# Patient Record
Sex: FEMALE | Race: WHITE | NOT HISPANIC OR LATINO | Employment: OTHER | URBAN - METROPOLITAN AREA
[De-identification: names, ages, dates, MRNs, and addresses within clinical notes are randomized per-mention and may not be internally consistent; named-entity substitution may affect disease eponyms.]

---

## 2017-01-12 ENCOUNTER — OFFICE VISIT (OUTPATIENT)
Dept: ORTHOPEDICS | Facility: CLINIC | Age: 42
End: 2017-01-12
Payer: MEDICARE

## 2017-01-12 ENCOUNTER — OFFICE VISIT (OUTPATIENT)
Dept: SPINE | Facility: CLINIC | Age: 42
End: 2017-01-12
Attending: NEUROLOGICAL SURGERY
Payer: MEDICARE

## 2017-01-12 ENCOUNTER — HOSPITAL ENCOUNTER (OUTPATIENT)
Dept: RADIOLOGY | Facility: HOSPITAL | Age: 42
Discharge: HOME | End: 2017-01-12
Attending: NEUROLOGICAL SURGERY
Payer: MEDICARE

## 2017-01-12 VITALS
SYSTOLIC BLOOD PRESSURE: 105 MMHG | BODY MASS INDEX: 21.09 KG/M2 | WEIGHT: 119 LBS | HEIGHT: 63 IN | HEART RATE: 68 BPM | DIASTOLIC BLOOD PRESSURE: 54 MMHG

## 2017-01-12 VITALS
BODY MASS INDEX: 21.21 KG/M2 | WEIGHT: 119.69 LBS | HEART RATE: 73 BPM | DIASTOLIC BLOOD PRESSURE: 73 MMHG | SYSTOLIC BLOOD PRESSURE: 110 MMHG

## 2017-01-12 DIAGNOSIS — M25.551 RIGHT HIP PAIN: Primary | ICD-10-CM

## 2017-01-12 DIAGNOSIS — M79.10 MYALGIA: ICD-10-CM

## 2017-01-12 DIAGNOSIS — M41.125 ADOLESCENT IDIOPATHIC SCOLIOSIS OF THORACOLUMBAR REGION: ICD-10-CM

## 2017-01-12 DIAGNOSIS — M53.3 SACROILIAC JOINT PAIN: ICD-10-CM

## 2017-01-12 DIAGNOSIS — M47.816 FACET ARTHROPATHY, LUMBAR: ICD-10-CM

## 2017-01-12 PROCEDURE — 78320 NM BONE SCAN SPECT: CPT | Mod: 26,76,, | Performed by: RADIOLOGY

## 2017-01-12 PROCEDURE — 99213 OFFICE O/P EST LOW 20 MIN: CPT | Mod: PBBFAC,27 | Performed by: ANESTHESIOLOGY

## 2017-01-12 PROCEDURE — 99204 OFFICE O/P NEW MOD 45 MIN: CPT | Mod: S$PBB,,, | Performed by: ANESTHESIOLOGY

## 2017-01-12 PROCEDURE — 99999 PR PBB SHADOW E&M-EST. PATIENT-LVL III: CPT | Mod: PBBFAC,,, | Performed by: ANESTHESIOLOGY

## 2017-01-12 PROCEDURE — 99203 OFFICE O/P NEW LOW 30 MIN: CPT | Mod: S$PBB,,, | Performed by: PHYSICIAN ASSISTANT

## 2017-01-12 PROCEDURE — 78320 NM BONE SCAN SPECT: CPT | Mod: 26,,, | Performed by: RADIOLOGY

## 2017-01-12 PROCEDURE — 99999 PR PBB SHADOW E&M-EST. PATIENT-LVL III: CPT | Mod: PBBFAC,,, | Performed by: PHYSICIAN ASSISTANT

## 2017-01-12 RX ORDER — TOPIRAMATE 100 MG/1
100 TABLET, FILM COATED ORAL 2 TIMES DAILY
COMMUNITY
End: 2022-08-08

## 2017-01-12 RX ORDER — HYDROCODONE BITARTRATE AND ACETAMINOPHEN 10; 325 MG/1; MG/1
TABLET ORAL
COMMUNITY
End: 2022-08-19 | Stop reason: CLARIF

## 2017-01-12 RX ORDER — OXYCODONE AND ACETAMINOPHEN 10; 325 MG/1; MG/1
1 TABLET ORAL EVERY 4 HOURS PRN
COMMUNITY

## 2017-01-12 RX ORDER — TIZANIDINE 4 MG/1
4 TABLET ORAL EVERY 6 HOURS PRN
COMMUNITY
End: 2022-08-08

## 2017-01-12 RX ORDER — ALPRAZOLAM 0.5 MG/1
0.5 TABLET ORAL 3 TIMES DAILY
COMMUNITY
End: 2022-08-19 | Stop reason: CLARIF

## 2017-01-12 NOTE — LETTER
January 12, 2017      Beka Dwyer MD  [address]  [facility] [address]           [facility]  [address]  [address]  [facility] [address]  Phone: [phone]  Fax: [phone]          Patient: Rosalinda Gomes   MR Number: [id number]   YOB: 1975   Date of Visit: 1/12/2017       Dear Dr. Beka Dwyer:    Thank you for referring Rosalinda Gomes to me for evaluation. Attached you will find relevant portions of my assessment and plan of care.    If you have questions, please do not hesitate to call me. I look forward to following Rosalinda Gomes along with you.    Sincerely,    Theo Mcneill MD    Enclosure  CC:  No Recipients    If you would like to receive this communication electronically, please contact [email or web address] or [phone] to request more information on [employer] Link access.    For providers and/or their staff who would like to refer a patient to [facility], please contact us through our one-stop-shop provider referral line, [facility], at [phone].    If you feel you have received this communication in error or would no longer like to receive these types of communications, please e-mail [email or web address]

## 2017-01-12 NOTE — PROGRESS NOTES
Chronic Pain - New Consult    Referring Physician: Beka Dwyer MD    Chief Complaint:   Chief Complaint   Patient presents with    Scoliosis    Back Pain     right    Low-back Pain    Hip Pain     right        SUBJECTIVE: Disclaimer: This note has been generated using voice-recognition software. There may be typographical errors that have been missed during proof-reading    Initial encounter:    Rosalinda Gomes presents to the clinic for the evaluation of back pain. The pain started  2 in a half years ago following on set and symptoms have been worsening.    Brief history:  42yo F with hx of adolescent idiopathic scoliosis in adulthood with symptomatic progression and suspicion for curve progression who may require fusion but currently being managed conservatively with custom brace referred from Dr. Dwyer for possible facet joint injections    Pain Description:    The pain is located in the right back area and radiates to the lower back and right hip.      At BEST  8/10     At WORST  10/10 on the WORST day.      On average pain is rated as 9/10.     Today the pain is rated as 10/10    The pain is described as aching, sharp, shooting and stabbing      Symptoms interfere with daily activity, sleeping and work.     Exacerbating factors: Sitting, Standing, Bending, Walking and Getting out of bed/chair.      Mitigating factors rest, medications.     Patient denies night fever/night sweats, urinary incontinence, bowel incontinence and significant weight loss.  Patient denies any suicidal or homicidal ideations    Pain Medications:  Current:  Norco 10-325mg  Percocet 10-325mg    Tried in Past:  NSAIDs -Never  TCA -Never  SNRI -Never  Anti-convulsants -Never  Muscle Relaxants -Never  Opioids-Never    Physical Therapy/Home Exercise: yes but did not start yet     report:  Reviewed and consistent with medication use as prescribed.    Pain Procedures: none    Chiropractor -never  Acupuncture -  never  TENS unit -never  Spinal decompression -never  Joint replacement -never    Imagin/29/16NM Bone Scan Spect   Narrative   Findings: Patient was administered a 27.7 mCi of technetium 99m labeled MDP.  There is a dextroconvex curvature of the T-spine, levoconvex curvature of the L-spine.  There is no evidence to suggest osteoid osteoma, fracture, or stress fracture.   Impression    Scoliosis likely idiopathic.      Electronically signed by: DARLENE MÉNDEZ  Date: 17  Time: 14:39     Encounter   View Encounter        16 Scoliosis xray complete  Findings: There is a dextroconvex curvature of the T-spine 38 degrees and levoconvex curvature L-spine 44 degrees.  There is kyphosis and DJD.  No segmentation or rib anomalies are seen.    16 R hip xray  2 views: No fracture dislocation bone destruction seen.    History reviewed. No pertinent past medical history.  History reviewed. No pertinent past surgical history.  Social History     Social History    Marital status:      Spouse name: N/A    Number of children: N/A    Years of education: N/A     Occupational History    Not on file.     Social History Main Topics    Smoking status: Current Every Day Smoker    Smokeless tobacco: Not on file    Alcohol use Not on file    Drug use: Not on file    Sexual activity: Not on file     Other Topics Concern    Not on file     Social History Narrative     History reviewed. No pertinent family history.    Review of patient's allergies indicates:   Allergen Reactions    Monosodium glutamate     Pcn [penicillins]     Sulfa (sulfonamide antibiotics)        Current Outpatient Prescriptions   Medication Sig    alprazolam (XANAX) 0.5 MG tablet Take 0.5 mg by mouth 3 (three) times daily.    tizanidine (ZANAFLEX) 4 MG tablet Take 4 mg by mouth every 6 (six) hours as needed.    topiramate (TOPAMAX) 100 MG tablet Take 100 mg by mouth 2 (two) times daily.    ACETAMINOPHEN (TYLENOL ORAL) Take by  "mouth.    aspirin-acetaminophen-caffeine 250-250-65 mg (EXCEDRIN MIGRAINE) 250-250-65 mg per tablet Take 1 tablet by mouth every 6 (six) hours as needed for Pain.    ASPIRIN/ACETAMINOPHEN/CAFFEINE (EXCEDRIN MIGRAINE ORAL) Take by mouth.    DOCOSANOL (ABREVA TOP) Apply topically.    hydrocodone-acetaminophen 10-325mg (NORCO)  mg Tab Take by mouth.    oxycodone-acetaminophen (PERCOCET)  mg per tablet Take 1 tablet by mouth every 4 (four) hours as needed for Pain.     No current facility-administered medications for this visit.        REVIEW OF SYSTEMS:    GENERAL:  No weight loss, malaise or fevers.  HEENT:   No recent changes in vision or hearing  NECK:  Negative for lumps, no difficulty with swallowing.  RESPIRATORY:  Negative for cough, wheezing or shortness of breath, patient denies any recent URI.  CARDIOVASCULAR:  Negative for chest pain, leg swelling or palpitations.  GI:  Negative for abdominal discomfort, blood in stools or black stools or change in bowel habits.  MUSCULOSKELETAL:  See HPI.  SKIN:  Negative for lesions, rash, and itching.  PSYCH:  No mood disorder or recent psychosocial stressors.  Patients sleep is disturbed secondary to pain.  HEMATOLOGY/LYMPHOLOGY:  Negative for prolonged bleeding, bruising easily or swollen nodes.  Patient is not currently taking any anti-coagulants  ENDO: No history of diabetes or thyroid dysfunction  NEURO:   Topamax for history of seizure d/o  All other reviewed and negative other than HPI.    OBJECTIVE:    Visit Vitals    BP (!) 105/54    Pulse 68    Ht 5' 3" (1.6 m)    Wt 54 kg (119 lb)    BMI 21.08 kg/m2       PHYSICAL EXAMINATION:    GENERAL: Well appearing, in no acute distress, alert and oriented x3.  PSYCH:  Mood and affect appropriate.  SKIN: Skin color, texture, turgor normal, no rashes or lesions.  HEAD/FACE:  Normocephalic, atraumatic. Cranial nerves grossly intact.  CV: RRR with palpation of the radial artery.  PULM: No evidence of " respiratory difficulty, symmetric chest rise.  BACK: Straight leg raising in the sitting and supine positions is negative to radicular pain. There is pain with palpation over the facet joints of the lumbar spine bilaterally. There is decreased range of motion with extension to 15 degrees, and facet loading maneuvers cause reproducible pain.    EXTREMITIES: Peripheral joint ROM is full and pain free without obvious instability or laxity in all four extremities. No deformities, edema, or skin discoloration. Good capillary refill.  MUSCULOSKELETAL:  Pain with movement of the right hip in particular internal rotation of the hip and limited range of motion of the hip secondary to pain..  There is  pain with palpation over the sacroiliac joints bilaterally right > left.  There is no pain to palpation over the greater trochanteric bursa bilaterally.  FABERs test is positive, but there is limited range of motion of the right hip.  FADIRs test is negative.   Bilateral lower extremity strength is normal and symmetric.  No atrophy or tone abnormalities are noted.  NEURO: Bilateral lower extremity coordination and muscle stretch reflexes are physiologic and symmetric.  Plantar response are downgoing. No clonus.  No loss of sensation is noted.  GAIT: normal.      ASSESSMENT: 41 y.o. year old female with pain, consistent with     Encounter Diagnoses   Name Primary?    Facet arthropathy, lumbar     Sacroiliac joint pain     Myalgia        PLAN:   Images personally reviewed with the patient in the office including nuclear medicine bone scan which showed diffuse facet arthropathy.    Trigger Point Injection:   The procedure was discussed with the patient including complications of nerve damage,  bleeding, infection, and failure of pain relief.   Trigger points were identified by palpation and marked. Chlorhexidine prep of sites done. A mixture of 9mL 0.25% bupivacaine +40mg Depo-Medrol was prepared (10 mL total).   A 27-gauge  needle was advanced to the point of maximal tenderness, and  1.5 mL  was injected after negative aspiration. All sites done in the same manner. Patient tolerated the procedure well and without complications. Sites injected included:  paraspinal muscles bilaterally at the levels of L3/4, L4/5, L5/S1 and over the right SI joint.    In the future we can discuss trial of Celebrex depending on response to the TPI    We will Schedule the patient for medial branch nerve blocks of the lumbar spine at the levels of the transverse processes of L3, L4, L5, and the sacral ala. If the patient receives greater than 80% improvement in their pain symptoms on the day of the procedure, we will proceed with radiofrequency ablation of the medial branch nerves at the above levels.     TLSO brace as per NS    F/u with tests for the hip and ortho referral    The patient that she should continue to obtain her opioids from her provider in [place] as they can better track prescriptions within the state rather than crossing state lines.    AMY to obtain records from pain management clinic in [facility]    Greater than 25 minutes spent in total in todays visit with the patient, with more than half that time direct face to face counseling and education with the patient today. We discussed the disease process, prognosis, treatment plan, and risks and benefits.     Theo Mcneill  01/12/2017

## 2017-01-12 NOTE — LETTER
January 12, 2017      Beka Dwyer MD  [address] [address]           [facility]  [address]  [facility] [address]  Phone: [phone]          Patient: Rosalinda Gomes   MR Number: [id number]   YOB: 1975   Date of Visit: 1/12/2017       Dear Dr. Beka Dwyer:    Thank you for referring Rosalinda Gomes to me for evaluation. Attached you will find relevant portions of my assessment and plan of care.    If you have questions, please do not hesitate to call me. I look forward to following Rosalinda Gomes along with you.    Sincerely,    Vianca Disla PA-C    Enclosure  CC:  No Recipients    If you would like to receive this communication electronically, please contact [email or web address] or [phone] to request more information on [employer] Link access.    For providers and/or their staff who would like to refer a patient to [facility], please contact us through our one-stop-shop provider referral line, [facility] , at [phone].    If you feel you have received this communication in error or would no longer like to receive these types of communications, please e-mail [email or web address]

## 2017-01-12 NOTE — MR AVS SNAPSHOT
[facility]  [address]  [address]  [facility] [address]  Phone: [phone]  Fax: [phone]                  Rosalinda Gomes   2017 2:45 PM   Office Visit    Description:  Female : 1975   Provider:  Theo Mcneill MD   Department:  [facility]           Reason for Visit     Scoliosis     Back Pain     Low-back Pain     Hip Pain                To Do List           Future Appointments        Provider Department Dept Phone    2017 3:00 PM [facility] [id number] 350 LB LIMIT [facility] [phone]    2017 4:30 PM [facility] MRI WIDE BORE [facility] [phone]    2017 11:00 AM Michelle Hernandez MD [facility] [phone]      Goals (5 Years of Data)     None      [facility]     [facility] Nurse Care Line -  Assistance  Registered nurses in the [facility] provide clinical advisement, health education, appointment booking, and other advisory services.  Call for this free service at [phone].             Medications           Message regarding Medications     Verify the changes and/or additions to your medication regime listed below are the same as discussed with your clinician today.  If any of these changes or additions are incorrect, please notify your healthcare provider.             Verify that the below list of medications is an accurate representation of the medications you are currently taking.  If none reported, the list may be blank. If incorrect, please contact your healthcare provider. Carry this list with you in case of emergency.           Current Medications     alprazolam (XANAX) 0.5 MG tablet Take 0.5 mg by mouth 3 (three) times daily.    hydrocodone-acetaminophen 10-325mg (NORCO)  mg Tab Take by mouth.    oxycodone-acetaminophen (PERCOCET)  mg per tablet Take 1 tablet by mouth every 4 (four) hours as needed for Pain.    tizanidine  "(ZANAFLEX) 4 MG tablet Take 4 mg by mouth every 6 (six) hours as needed.    topiramate (TOPAMAX) 100 MG tablet Take 100 mg by mouth 2 (two) times daily.           Clinical Reference Information           Vital Signs - Last Recorded  Most recent update: 1/12/2017  3:29 PM by Ellie Terry MA    BP Pulse Ht Wt BMI    (!) 105/54 68 5' 3" (1.6 m) 54 kg (119 lb) 21.08 kg/m2      Blood Pressure          Most Recent Value    BP  (!)  105/54      Allergies as of 1/12/2017     Monosodium Glutamate    Pcn [Penicillins]    Sulfa (Sulfonamide Antibiotics)      Immunizations Administered on Date of Encounter - 1/12/2017     None      [facility] Sign-Up     Activating your [facility] account is as easy as 1-2-3!     1) Visit [email or web address], select Sign Up Now, enter this activation code and your date of birth, then select Next.  [email or web address]  Expires: 2/12/2017  9:40 AM      2) Create a username and password to use when you visit [facility] in the future and select a security question in case you lose your password and select Next.    3) Enter your e-mail address and click Sign Up!    Additional Information  If you have questions, please e-mail [email or web address] or call [phone] to talk to our [facility] staff. Remember, [facility] is NOT to be used for urgent needs. For medical emergencies, dial 911.         Smoking Cessation     If you would like to quit smoking:   You may be eligible for free services if you are a [place] resident and started smoking cigarettes before September 1, 1988.  Call the Smoking Cessation Trust (SCT) toll free at (192) 455-0374 or (203) 660-1310.   Call 2-697-QUIT-NOW if you do not meet the above criteria.            "
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
## 2017-01-12 NOTE — MR AVS SNAPSHOT
[facility]  [address] [address]  Phone: [phone]                  Rosalinda Gomes   2017 1:15 PM   Appointment    Description:  Female : 1975   Provider:  Vianca Disla PA-C   Department:  [facility] - Orthopedics                To Do List           Future Appointments        Provider Department Dept Phone    2017 12:00 PM [facility] NM3 MG1 [address] LIMIT [facility] [phone]    2017 1:15 PM Vianca Disla PA-C [facility] [phone]    2017 2:45 PM Theo Mcneill MD [facility] [phone]      Goals (5 Years of Data)     None      [facility]     [facility] Nurse Care Line -  Assistance  Registered nurses in the [facility] provide clinical advisement, health education, appointment booking, and other advisory services.  Call for this free service at [phone].             Medications           Message regarding Medications     Verify the changes and/or additions to your medication regime listed below are the same as discussed with your clinician today.  If any of these changes or additions are incorrect, please notify your healthcare provider.             Verify that the below list of medications is an accurate representation of the medications you are currently taking.  If none reported, the list may be blank. If incorrect, please contact your healthcare provider. Carry this list with you in case of emergency.                Clinical Reference Information           Allergies as of 2017     No Known Allergies      Immunizations Administered on Date of Encounter - 2017     None      [facility] Sign-Up     Activating your [facility] account is as easy as 1-2-3!     1) Visit [email or web address], select Sign Up Now, enter this activation code and your date of birth, then select Next.  [email or web address]  Expires: 2017  9:40 AM      2) Create a  username and password to use when you visit [facility] in the future and select a security question in case you lose your password and select Next.    3) Enter your e-mail address and click Sign Up!    Additional Information  If you have questions, please e-mail [email or web address] or call [phone] to talk to our [facility] staff. Remember, [facility] is NOT to be used for urgent needs. For medical emergencies, dial 911.         Smoking Cessation     If you would like to quit smoking:   You may be eligible for free services if you are a [place] resident and started smoking cigarettes before September 1, 1988.  Call the Smoking Cessation Trust (SCT) toll free at (527) 653-4848 or (685) 765-7379.   Call 9-117-QUIT-NOW if you do not meet the above criteria.

## 2017-01-20 ENCOUNTER — HOSPITAL ENCOUNTER (OUTPATIENT)
Dept: RADIOLOGY | Facility: HOSPITAL | Age: 42
Discharge: HOME | End: 2017-01-20
Attending: ORTHOPAEDIC SURGERY
Payer: MEDICARE

## 2017-01-20 DIAGNOSIS — M25.551 RIGHT HIP PAIN: ICD-10-CM

## 2017-01-20 PROCEDURE — A9585 GADOBUTROL INJECTION: HCPCS | Performed by: ORTHOPAEDIC SURGERY

## 2017-01-20 PROCEDURE — 73525 CONTRAST X-RAY OF HIP: CPT | Mod: TC

## 2017-01-20 PROCEDURE — 27093 INJECTION FOR HIP X-RAY: CPT | Mod: GC,,, | Performed by: RADIOLOGY

## 2017-01-20 PROCEDURE — 73525 CONTRAST X-RAY OF HIP: CPT | Mod: 26,GC,, | Performed by: RADIOLOGY

## 2017-01-20 PROCEDURE — 73722 MRI JOINT OF LWR EXTR W/DYE: CPT | Mod: 26,RT,GC, | Performed by: RADIOLOGY

## 2017-01-20 PROCEDURE — 25000003 PHARM REV CODE 250: Performed by: ORTHOPAEDIC SURGERY

## 2017-01-20 PROCEDURE — 25500020 PHARM REV CODE 255: Performed by: ORTHOPAEDIC SURGERY

## 2017-01-20 PROCEDURE — 73722 MRI JOINT OF LWR EXTR W/DYE: CPT | Mod: TC,RT

## 2017-01-20 RX ORDER — GADOBUTROL 604.72 MG/ML
5 INJECTION INTRAVENOUS
Status: COMPLETED | OUTPATIENT
Start: 2017-01-20 | End: 2017-01-20

## 2017-01-20 RX ORDER — LIDOCAINE HYDROCHLORIDE 10 MG/ML
3 INJECTION, SOLUTION EPIDURAL; INFILTRATION; INTRACAUDAL; PERINEURAL ONCE
Status: COMPLETED | OUTPATIENT
Start: 2017-01-20 | End: 2017-01-20

## 2017-01-20 RX ORDER — BUPIVACAINE HYDROCHLORIDE 2.5 MG/ML
5 INJECTION, SOLUTION EPIDURAL; INFILTRATION; INTRACAUDAL ONCE
Status: COMPLETED | OUTPATIENT
Start: 2017-01-20 | End: 2017-01-20

## 2017-01-20 RX ADMIN — LIDOCAINE HYDROCHLORIDE 30 MG: 10 INJECTION, SOLUTION EPIDURAL; INFILTRATION; INTRACAUDAL; PERINEURAL at 03:01

## 2017-01-20 RX ADMIN — BUPIVACAINE HYDROCHLORIDE 12.5 MG: 2.5 INJECTION, SOLUTION EPIDURAL; INFILTRATION; INTRACAUDAL; PERINEURAL at 03:01

## 2017-01-20 RX ADMIN — IOHEXOL 7 ML: 300 INJECTION, SOLUTION INTRAVENOUS at 03:01

## 2017-01-20 RX ADMIN — GADOBUTROL 5 ML: 604.72 INJECTION INTRAVENOUS at 03:01

## 2017-01-23 ENCOUNTER — HOSPITAL ENCOUNTER (OUTPATIENT)
Dept: RADIOLOGY | Facility: HOSPITAL | Age: 42
Discharge: HOME | End: 2017-01-23
Attending: ORTHOPAEDIC SURGERY
Payer: MEDICARE

## 2017-01-23 ENCOUNTER — OFFICE VISIT (OUTPATIENT)
Dept: SPORTS MEDICINE | Facility: CLINIC | Age: 42
End: 2017-01-23
Payer: MEDICARE

## 2017-01-23 VITALS
BODY MASS INDEX: 19.49 KG/M2 | HEIGHT: 63 IN | DIASTOLIC BLOOD PRESSURE: 75 MMHG | WEIGHT: 110 LBS | HEART RATE: 92 BPM | SYSTOLIC BLOOD PRESSURE: 106 MMHG

## 2017-01-23 DIAGNOSIS — S73.191A TEAR OF ACETABULAR LABRUM, RIGHT, INITIAL ENCOUNTER: Primary | ICD-10-CM

## 2017-01-23 DIAGNOSIS — M25.551 RIGHT HIP PAIN: ICD-10-CM

## 2017-01-23 PROCEDURE — 73502 X-RAY EXAM HIP UNI 2-3 VIEWS: CPT | Mod: TC,PO

## 2017-01-23 PROCEDURE — 99999 PR PBB SHADOW E&M-EST. PATIENT-LVL III: CPT | Mod: PBBFAC,,, | Performed by: ORTHOPAEDIC SURGERY

## 2017-01-23 PROCEDURE — 99203 OFFICE O/P NEW LOW 30 MIN: CPT | Mod: S$PBB,,, | Performed by: ORTHOPAEDIC SURGERY

## 2017-01-23 PROCEDURE — 73502 X-RAY EXAM HIP UNI 2-3 VIEWS: CPT | Mod: 26,,, | Performed by: RADIOLOGY
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
## 2017-01-23 NOTE — MR AVS SNAPSHOT
[facility]  [address]  [facility] [address]  Phone: [phone]                  Rosalinda Gomes   2017 11:00 AM   Appointment    Description:  Female : 1975   Provider:  Michelle Hernandez MD   Department:  [facility]                To Do List           Your Future Surgeries/Procedures     2017   Surgery with Theo Mcneill MD   [facility]    [address]  [facility] [id number] [phone]              Goals (5 Years of Data)     None      [facility]     [facility] Nurse Care Line -  Assistance  Registered nurses in the [facility] provide clinical advisement, health education, appointment booking, and other advisory services.  Call for this free service at [phone].             Medications           Message regarding Medications     Verify the changes and/or additions to your medication regime listed below are the same as discussed with your clinician today.  If any of these changes or additions are incorrect, please notify your healthcare provider.             Verify that the below list of medications is an accurate representation of the medications you are currently taking.  If none reported, the list may be blank. If incorrect, please contact your healthcare provider. Carry this list with you in case of emergency.           Current Medications     ACETAMINOPHEN (TYLENOL ORAL) Take by mouth.    alprazolam (XANAX) 0.5 MG tablet Take 0.5 mg by mouth 3 (three) times daily.    aspirin-acetaminophen-caffeine 250-250-65 mg (EXCEDRIN MIGRAINE) 250-250-65 mg per tablet Take 1 tablet by mouth every 6 (six) hours as needed for Pain.    ASPIRIN/ACETAMINOPHEN/CAFFEINE (EXCEDRIN MIGRAINE ORAL) Take by mouth.    DOCOSANOL (ABREVA TOP) Apply topically.    hydrocodone-acetaminophen 10-325mg (NORCO)  mg Tab Take by mouth.    oxycodone-acetaminophen (PERCOCET)   mg per tablet Take 1 tablet by mouth every 4 (four) hours as needed for Pain.    tizanidine (ZANAFLEX) 4 MG tablet Take 4 mg by mouth every 6 (six) hours as needed.    topiramate (TOPAMAX) 100 MG tablet Take 100 mg by mouth 2 (two) times daily.           Clinical Reference Information           Allergies as of 1/23/2017     Monosodium Glutamate    Pcn [Penicillins]    Sulfa (Sulfonamide Antibiotics)      Immunizations Administered on Date of Encounter - 1/23/2017     None      Smoking Cessation     If you would like to quit smoking:   You may be eligible for free services if you are a [place] resident and started smoking cigarettes before September 1, 1988.  Call the Smoking Cessation Trust ([facility]) toll free at (878) 863-7064 or (453) 782-5004.   Call 1-780-QUIT-NOW if you do not meet the above criteria.

## 2017-01-23 NOTE — LETTER
January 23, 2017      Vianca Disla PA-C  [address]  [facility] [address]           [facility]  [address]  [facility] [address]  Phone: [phone]          Patient: Rosalinda Gomes   MR Number: [id number]   YOB: 1975   Date of Visit: 1/23/2017       Dear Vianca Disla:    Thank you for referring Rosalinda Gomes to me for evaluation. Attached you will find relevant portions of my assessment and plan of care.    If you have questions, please do not hesitate to call me. I look forward to following Rosalinda Goems along with you.    Sincerely,    Michelle Hernandez MD    Enclosure  CC:  No Recipients    If you would like to receive this communication electronically, please contact [email or web address] or [phone] to request more information on e[employer] access.    For providers and/or their staff who would like to refer a patient to [facility], please contact us through our one-stop-shop provider referral line, [facility], at [phone].    If you feel you have received this communication in error or would no longer like to receive these types of communications, please e-mail [email or web address]

## 2017-02-08 ENCOUNTER — HOSPITAL ENCOUNTER (OUTPATIENT)
Facility: OTHER | Age: 42
Discharge: HOME | End: 2017-02-08
Attending: ANESTHESIOLOGY | Admitting: ANESTHESIOLOGY
Payer: MEDICARE

## 2017-02-08 ENCOUNTER — SURGERY (OUTPATIENT)
Age: 42
End: 2017-02-08

## 2017-02-08 VITALS
RESPIRATION RATE: 16 BRPM | HEIGHT: 63 IN | HEART RATE: 73 BPM | TEMPERATURE: 98 F | BODY MASS INDEX: 19.84 KG/M2 | SYSTOLIC BLOOD PRESSURE: 94 MMHG | DIASTOLIC BLOOD PRESSURE: 54 MMHG | WEIGHT: 112 LBS | OXYGEN SATURATION: 100 %

## 2017-02-08 DIAGNOSIS — M47.816 FACET ARTHROPATHY, LUMBAR: Primary | ICD-10-CM

## 2017-02-08 PROCEDURE — 64494 INJ PARAVERT F JNT L/S 2 LEV: CPT | Mod: 50 | Performed by: ANESTHESIOLOGY

## 2017-02-08 PROCEDURE — 64494 INJ PARAVERT F JNT L/S 2 LEV: CPT | Mod: 50,,, | Performed by: ANESTHESIOLOGY

## 2017-02-08 PROCEDURE — 64495 INJ PARAVERT F JNT L/S 3 LEV: CPT | Mod: 50 | Performed by: ANESTHESIOLOGY

## 2017-02-08 PROCEDURE — 64495 INJ PARAVERT F JNT L/S 3 LEV: CPT | Mod: 50,,, | Performed by: ANESTHESIOLOGY

## 2017-02-08 PROCEDURE — 64493 INJ PARAVERT F JNT L/S 1 LEV: CPT | Mod: 50 | Performed by: ANESTHESIOLOGY

## 2017-02-08 PROCEDURE — 64493 INJ PARAVERT F JNT L/S 1 LEV: CPT | Mod: 50,,, | Performed by: ANESTHESIOLOGY

## 2017-02-08 PROCEDURE — 25000003 PHARM REV CODE 250: Performed by: ANESTHESIOLOGY

## 2017-02-08 RX ORDER — LIDOCAINE HYDROCHLORIDE 10 MG/ML
10 INJECTION INFILTRATION; PERINEURAL
Status: COMPLETED | OUTPATIENT
Start: 2017-02-08 | End: 2017-02-08

## 2017-02-08 RX ORDER — BUPIVACAINE HYDROCHLORIDE 5 MG/ML
INJECTION, SOLUTION EPIDURAL; INTRACAUDAL
Status: DISCONTINUED | OUTPATIENT
Start: 2017-02-08 | End: 2017-02-08 | Stop reason: HOSPADM

## 2017-02-08 RX ORDER — BUPIVACAINE HYDROCHLORIDE 5 MG/ML
3 INJECTION, SOLUTION PERINEURAL ONCE
Status: DISCONTINUED | OUTPATIENT
Start: 2017-02-08 | End: 2017-02-08 | Stop reason: HOSPADM

## 2017-02-08 RX ADMIN — BUPIVACAINE HYDROCHLORIDE 10 ML: 5 INJECTION, SOLUTION EPIDURAL; INTRACAUDAL; PERINEURAL at 01:02

## 2017-02-08 RX ADMIN — LIDOCAINE HYDROCHLORIDE 20 ML: 10 INJECTION, SOLUTION INFILTRATION; PERINEURAL at 01:02

## 2017-02-08 NOTE — IP AVS SNAPSHOT
[facility] ([facility])  [address] [place], [place] [id number]  Phone: [phone]           Patient Discharge Instructions     Our goal is to set you up for success. This packet includes information on your condition, medications, and your home care. It will help you to care for yourself so you don't get sicker and need to go back to the hospital.     Please ask your nurse if you have any questions.        There are many details to remember when preparing to leave the hospital. Here is what you will need to do:    1. Take your medicine. If you are prescribed medications, review your Medication List in the following pages. You may have new medications to  at the pharmacy and others that you'll need to stop taking. Review the instructions for how and when to take your medications. Talk with your doctor or nurses if you are unsure of what to do.     2. Go to your follow-up appointments. Specific follow-up information is listed in the following pages. Your may be contacted by a transition nurse or clinical provider about future appointments. Be sure we have all of the phone numbers to reach you, if needed. Please contact your provider's office if you are unable to make an appointment.     3. Watch for warning signs. Your doctor or nurse will give you detailed warning signs to watch for and when to call for assistance. These instructions may also include educational information about your condition. If you experience any of warning signs to your health, call your doctor.               [facility]  Unless otherwise directed by your provider, please contact [facility], our nurse care line that is available for 24/7 assistance.     [phone] (toll-free)    Registered nurses in the [facility] provide clinical advisement, health education, appointment booking, and other advisory services.                    ** Verify the list of medication(s) below is accurate and up to  date. Carry this with you in case of emergency. If your medications have changed, please notify your healthcare provider.             Medication List      ASK your doctor about these medications        Additional Info                      YUA TOP   Refills:  0    Instructions:  Apply topically.     Begin Date    AM    Noon    PM    Bedtime       alprazolam 0.5 MG tablet   Commonly known as:  XANAX   Refills:  0   Dose:  0.5 mg    Instructions:  Take 0.5 mg by mouth 3 (three) times daily.     Begin Date    AM    Noon    PM    Bedtime       * EXCEDRIN MIGRAINE ORAL   Refills:  0    Instructions:  Take by mouth.     Begin Date    AM    Noon    PM    Bedtime       * aspirin-acetaminophen-caffeine 250-250-65 mg 250-250-65 mg per tablet   Commonly known as:  EXCEDRIN MIGRAINE   Refills:  0   Dose:  1 tablet    Instructions:  Take 1 tablet by mouth every 6 (six) hours as needed for Pain.     Begin Date    AM    Noon    PM    Bedtime       hydrocodone-acetaminophen 10-325mg  mg Tab   Commonly known as:  NORCO   Refills:  0    Instructions:  Take by mouth.     Begin Date    AM    Noon    PM    Bedtime       oxycodone-acetaminophen  mg per tablet   Commonly known as:  PERCOCET   Refills:  0   Dose:  1 tablet    Instructions:  Take 1 tablet by mouth every 4 (four) hours as needed for Pain.     Begin Date    AM    Noon    PM    Bedtime       tizanidine 4 MG tablet   Commonly known as:  ZANAFLEX   Refills:  0   Dose:  4 mg    Instructions:  Take 4 mg by mouth every 6 (six) hours as needed.     Begin Date    AM    Noon    PM    Bedtime       topiramate 100 MG tablet   Commonly known as:  TOPAMAX   Refills:  0   Dose:  100 mg    Instructions:  Take 100 mg by mouth 2 (two) times daily.     Begin Date    AM    Noon    PM    Bedtime       TYLENOL ORAL   Refills:  0    Instructions:  Take by mouth.     Begin Date    AM    Noon    PM    Bedtime       * Notice:  This list has 2 medication(s) that are the same as other  medications prescribed for you. Read the directions carefully, and ask your doctor or other care provider to review them with you.               Please bring to all follow up appointments:    1. A copy of your discharge instructions.  2. All medicines you are currently taking in their original bottles.  3. Identification and insurance card.    Please arrive 15 minutes ahead of scheduled appointment time.    Please call 24 hours in advance if you must reschedule your appointment and/or time.        Your Future Surgeries/Procedures     Feb 08, 2017   Surgery with Theo Mcneill MD   [facility]    [address] [facility] [address]   [phone]            Feb 15, 2017   Surgery with Theo Mcneill MD   [facility]    [address] [facility] [address]   [phone]                  Discharge Instructions       Home Care Instructions Pain Management:    1. DIET:   You may resume your normal diet today.   2. BATHING:   You may shower with luke warm water.  3. DRESSING:   You may remove your bandage today.   4. ACTIVITY LEVEL:   You may resume your normal activities 24 hrs after your procedure.  5. MEDICATIONS:   You may resume your normal medications today.   6. SPECIAL INSTRUCTIONS:   No heat to the injection site for 24 hrs including, bath or shower, heating pad, moist heat, or hot tubs.    Use ice pack to injection site for any pain or discomfort.  Apply ice packs for 20 minute intervals as needed.   If you have received any sedatives by mouth today you may not drive for 12 hours.    If you have received any sedation through your IV, you may not drive for 24 hrs.     PLEASE CALL YOUR DOCTOR IF:  1. Redness or swelling around the injection site.  2. Fever of 101 degrees  3. Drainage (pus) from the injection site.  4. For any continuous bleeding (some dried blood over the incision is normal.)    FOR  "EMERGENCIES:   If any unusual problems or difficulties occur during clinic hours, call [phone] or [phone].         Admission Information     Date & Time Provider Department CSN    2/8/2017 12:07 PM Theo Mcneill MD [facility] [id number]      Care Providers     Provider Role Specialty Primary office phone    Theo Mcneill MD Attending Provider Pain Medicine [phone]    Theo Mcneill MD Surgeon  Pain Medicine [phone]      Your Vitals Were     BP Pulse Temp Resp Height Weight    101/55 98 97.6 °F (36.4 °C) (Oral) 18 5' 3" (1.6 m) 50.8 kg (112 lb)    SpO2 BMI             100% 19.84 kg/m2         Recent Lab Values     No lab values to display.      Allergies as of 2/8/2017        Reactions    Monosodium Glutamate     Pcn [Penicillins]     Sulfa (Sulfonamide Antibiotics)       Advance Directives     An advance directive is a document which, in the event you are no longer able to make decisions for yourself, tells your healthcare team what kind of treatment you do or do not want to receive, or who you would like to make those decisions for you.  If you do not currently have an advance directive, [facility] encourages you to create one.  For more information call:  [phone] ([phone]), [phone] ([phone]),  or log on to [email or web address].        Smoking Cessation     If you would like to quit smoking:   You may be eligible for free services if you are a [place] resident and started smoking cigarettes before September 1, 1988.  Call the Smoking Cessation Trust ([employer]) toll free at (892) 233-5339 or (102) 391-1336.   Call 1-800-QUIT-NOW if you do not meet the above criteria.            Language Assistance Services     ATTENTION: Language assistance services are available, free of charge. Please call [phone].      ATENCIÓN: Si habla español, tiene a cobian disposición servicios gratuitos de asistencia lingüística. Llame al [phone].     CHÚ Ý: N?u b?n " nói Ti?ng Vi?t, có các d?ch v? h? tr? ngôn ng? mi?n phí dành cho b?n. G?i s? [phone].         [facility] complies with applicable Federal civil rights laws and does not discriminate on the basis of race, color, national origin, age, disability, or sex.

## 2017-02-08 NOTE — DISCHARGE INSTRUCTIONS

## 2017-02-08 NOTE — DISCHARGE SUMMARY
Discharge Note  Short Stay      SUMMARY     Admit Date: 2/8/2017    Attending Physician: Theo Mcneill      Discharge Physician: Theo Mcneill      Discharge Date: 2/8/2017 1:20 PM     PROCEDURE:  Bilateral medial branch block at the transverse processes at the level of L3, L4, L5, Sacral ala    REASON FOR PROCEDURE: Facet arthropathy of the lumbar spine      Disposition: Home or self care    Patient Instructions:   Current Discharge Medication List      CONTINUE these medications which have NOT CHANGED    Details   ACETAMINOPHEN (TYLENOL ORAL) Take by mouth.      alprazolam (XANAX) 0.5 MG tablet Take 0.5 mg by mouth 3 (three) times daily.      hydrocodone-acetaminophen 10-325mg (NORCO)  mg Tab Take by mouth.      oxycodone-acetaminophen (PERCOCET)  mg per tablet Take 1 tablet by mouth every 4 (four) hours as needed for Pain.      tizanidine (ZANAFLEX) 4 MG tablet Take 4 mg by mouth every 6 (six) hours as needed.      topiramate (TOPAMAX) 100 MG tablet Take 100 mg by mouth 2 (two) times daily.      !! aspirin-acetaminophen-caffeine 250-250-65 mg (EXCEDRIN MIGRAINE) 250-250-65 mg per tablet Take 1 tablet by mouth every 6 (six) hours as needed for Pain.      !! ASPIRIN/ACETAMINOPHEN/CAFFEINE (EXCEDRIN MIGRAINE ORAL) Take by mouth.      DOCOSANOL (ABREVA TOP) Apply topically.       !! - Potential duplicate medications found. Please discuss with provider.          Resume home diet and activity

## 2017-02-08 NOTE — OP NOTE
lUMBAR Medial Branch Block Under Fluoroscopy  Time-out taken to identify patient and procedure side prior to starting the procedure.            02/08/2017                                                         PROCEDURE:  Bilateral medial branch block at the transverse processes at the level of L3, L4, L5, Sacral ala    REASON FOR PROCEDURE: Facet arthropathy of the lumbar spine    PHYSICIAN: Theo Mcneill MD  Assistants:   Eduardo Luo MD PGY-5 Pain Fellow  I was present and supervising all critical portions of the procedure      MEDICATIONS INJECTED: 0.5% bupivicane, 1mL at each level    LOCAL ANESTHETIC USED: Xylocaine 1% 10ml    SEDATION MEDICATIONS: None    ESTIMATED BLOOD LOSS:  None.    COMPLICATIONS:  None.    TECHNIQUE: Laying in a prone position, the patient was prepped and draped in the usual sterile fashion using ChloraPrep and fenestrated drape.  The level was determined under fluoroscopic guidance.  Local anesthetic was given by going down to the hub of the 27-gauge 1.25in needle and raising a wheel.  A 22-gauge 3.5inch needle was introduced to the anatomic local of the medial branch at each of the above levels using fluoroscopy in the AP, oblique, and lateral views.  After negative aspiration, medication was injected slowly. The patient tolerated the procedure well.       The patient was monitored after the procedure.  Patient was given post procedure and discharge instructions to follow at home.  We will see the patient back in two weeks or the patient may call to inform of status. The patient was discharged in a stable condition

## 2017-02-09 ENCOUNTER — TELEPHONE (OUTPATIENT)
Dept: PAIN MEDICINE | Facility: CLINIC | Age: 42
End: 2017-02-09

## 2017-02-09 NOTE — TELEPHONE ENCOUNTER
----- Message from Barber Escobedo sent at 2/9/2017 12:05 PM CST -----  X_  1st Request  _  2nd Request  _  3rd Request        Who: Rosalinda Gomes    Why: Patient calling to give pain diary following procedure yesterday    What Number to Call Back: [phone]    When to Expect a call back: (Before the end of the day)   -- if the call is after 12:00, the call back will be tomorrow.

## 2017-02-15 ENCOUNTER — HOSPITAL ENCOUNTER (OUTPATIENT)
Facility: OTHER | Age: 42
Discharge: HOME | End: 2017-02-15
Attending: ANESTHESIOLOGY | Admitting: ANESTHESIOLOGY
Payer: MEDICARE

## 2017-02-15 ENCOUNTER — SURGERY (OUTPATIENT)
Age: 42
End: 2017-02-15

## 2017-02-15 VITALS
HEART RATE: 64 BPM | OXYGEN SATURATION: 100 % | RESPIRATION RATE: 18 BRPM | DIASTOLIC BLOOD PRESSURE: 55 MMHG | WEIGHT: 110 LBS | SYSTOLIC BLOOD PRESSURE: 94 MMHG | BODY MASS INDEX: 18.78 KG/M2 | HEIGHT: 64 IN | TEMPERATURE: 99 F

## 2017-02-15 DIAGNOSIS — M25.551 HIP PAIN, RIGHT: Primary | ICD-10-CM

## 2017-02-15 PROCEDURE — 25000003 PHARM REV CODE 250: Performed by: ANESTHESIOLOGY

## 2017-02-15 PROCEDURE — 77002 NEEDLE LOCALIZATION BY XRAY: CPT | Performed by: ANESTHESIOLOGY

## 2017-02-15 PROCEDURE — 20610 DRAIN/INJ JOINT/BURSA W/O US: CPT | Performed by: ANESTHESIOLOGY

## 2017-02-15 PROCEDURE — 63600175 PHARM REV CODE 636 W HCPCS: Performed by: ANESTHESIOLOGY

## 2017-02-15 PROCEDURE — 77002 NEEDLE LOCALIZATION BY XRAY: CPT | Mod: 26,GC,, | Performed by: ANESTHESIOLOGY

## 2017-02-15 PROCEDURE — 25500020 PHARM REV CODE 255: Performed by: ANESTHESIOLOGY

## 2017-02-15 PROCEDURE — 20610 DRAIN/INJ JOINT/BURSA W/O US: CPT | Mod: RT,GC,, | Performed by: ANESTHESIOLOGY

## 2017-02-15 RX ORDER — LIDOCAINE HYDROCHLORIDE 10 MG/ML
INJECTION, SOLUTION EPIDURAL; INFILTRATION; INTRACAUDAL; PERINEURAL
Status: DISCONTINUED | OUTPATIENT
Start: 2017-02-15 | End: 2017-02-15 | Stop reason: HOSPADM

## 2017-02-15 RX ORDER — BUPIVACAINE HYDROCHLORIDE 2.5 MG/ML
INJECTION, SOLUTION EPIDURAL; INFILTRATION; INTRACAUDAL
Status: DISCONTINUED | OUTPATIENT
Start: 2017-02-15 | End: 2017-02-15 | Stop reason: HOSPADM

## 2017-02-15 RX ORDER — BUPIVACAINE HYDROCHLORIDE 2.5 MG/ML
10 INJECTION, SOLUTION EPIDURAL; INFILTRATION; INTRACAUDAL ONCE
Status: DISCONTINUED | OUTPATIENT
Start: 2017-02-15 | End: 2017-02-15 | Stop reason: HOSPADM

## 2017-02-15 RX ORDER — METHYLPREDNISOLONE ACETATE 40 MG/ML
INJECTION, SUSPENSION INTRA-ARTICULAR; INTRALESIONAL; INTRAMUSCULAR; SOFT TISSUE
Status: DISCONTINUED | OUTPATIENT
Start: 2017-02-15 | End: 2017-02-15 | Stop reason: HOSPADM

## 2017-02-15 RX ORDER — LIDOCAINE HYDROCHLORIDE 10 MG/ML
10 INJECTION INFILTRATION; PERINEURAL
Status: DISCONTINUED | OUTPATIENT
Start: 2017-02-15 | End: 2017-02-15 | Stop reason: HOSPADM

## 2017-02-15 RX ORDER — METHYLPREDNISOLONE ACETATE 40 MG/ML
40 INJECTION, SUSPENSION INTRA-ARTICULAR; INTRALESIONAL; INTRAMUSCULAR; SOFT TISSUE ONCE
Status: DISCONTINUED | OUTPATIENT
Start: 2017-02-15 | End: 2017-02-15 | Stop reason: HOSPADM

## 2017-02-15 RX ADMIN — METHYLPREDNISOLONE ACETATE 40 MG: 40 INJECTION, SUSPENSION INTRA-ARTICULAR; INTRALESIONAL; INTRAMUSCULAR; SOFT TISSUE at 02:02

## 2017-02-15 RX ADMIN — IOHEXOL 50 ML: 300 INJECTION, SOLUTION INTRAVENOUS at 02:02

## 2017-02-15 RX ADMIN — BUPIVACAINE HYDROCHLORIDE 10 ML: 2.5 INJECTION, SOLUTION EPIDURAL; INFILTRATION; INTRACAUDAL; PERINEURAL at 02:02

## 2017-02-15 RX ADMIN — LIDOCAINE HYDROCHLORIDE 20 ML: 10 INJECTION, SOLUTION EPIDURAL; INFILTRATION; INTRACAUDAL; PERINEURAL at 02:02

## 2017-02-15 NOTE — IP AVS SNAPSHOT
[facility] Location ([facility])  [address] [place], [place] [id number]  Phone: [phone]           Patient Discharge Instructions     Our goal is to set you up for success. This packet includes information on your condition, medications, and your home care. It will help you to care for yourself so you don't get sicker and need to go back to the hospital.     Please ask your nurse if you have any questions.        There are many details to remember when preparing to leave the hospital. Here is what you will need to do:    1. Take your medicine. If you are prescribed medications, review your Medication List in the following pages. You may have new medications to  at the pharmacy and others that you'll need to stop taking. Review the instructions for how and when to take your medications. Talk with your doctor or nurses if you are unsure of what to do.     2. Go to your follow-up appointments. Specific follow-up information is listed in the following pages. Your may be contacted by a transition nurse or clinical provider about future appointments. Be sure we have all of the phone numbers to reach you, if needed. Please contact your provider's office if you are unable to make an appointment.     3. Watch for warning signs. Your doctor or nurse will give you detailed warning signs to watch for and when to call for assistance. These instructions may also include educational information about your condition. If you experience any of warning signs to your health, call your doctor.               [facility]  Unless otherwise directed by your provider, please contact [facility], our nurse care line that is available for 24/7 assistance.     [phone] (toll-free)    Registered nurses in the [facility] provide clinical advisement, health education, appointment booking, and other advisory services.                    ** Verify the list of medication(s) below is accurate and up to  date. Carry this with you in case of emergency. If your medications have changed, please notify your healthcare provider.             Medication List      CONTINUE taking these medications        Additional Info                      ABREVA TOP   Refills:  0    Instructions:  Apply topically.     Begin Date    AM    Noon    PM    Bedtime       alprazolam 0.5 MG tablet   Commonly known as:  XANAX   Refills:  0   Dose:  0.5 mg    Instructions:  Take 0.5 mg by mouth 3 (three) times daily.     Begin Date    AM    Noon    PM    Bedtime       * EXCEDRIN MIGRAINE ORAL   Refills:  0    Instructions:  Take by mouth.     Begin Date    AM    Noon    PM    Bedtime       * aspirin-acetaminophen-caffeine 250-250-65 mg 250-250-65 mg per tablet   Commonly known as:  EXCEDRIN MIGRAINE   Refills:  0   Dose:  1 tablet    Instructions:  Take 1 tablet by mouth every 6 (six) hours as needed for Pain.     Begin Date    AM    Noon    PM    Bedtime       hydrocodone-acetaminophen 10-325mg  mg Tab   Commonly known as:  NORCO   Refills:  0    Instructions:  Take by mouth.     Begin Date    AM    Noon    PM    Bedtime       oxycodone-acetaminophen  mg per tablet   Commonly known as:  PERCOCET   Refills:  0   Dose:  1 tablet    Instructions:  Take 1 tablet by mouth every 4 (four) hours as needed for Pain.     Begin Date    AM    Noon    PM    Bedtime       tizanidine 4 MG tablet   Commonly known as:  ZANAFLEX   Refills:  0   Dose:  4 mg    Instructions:  Take 4 mg by mouth every 6 (six) hours as needed.     Begin Date    AM    Noon    PM    Bedtime       topiramate 100 MG tablet   Commonly known as:  TOPAMAX   Refills:  0   Dose:  100 mg    Instructions:  Take 100 mg by mouth 2 (two) times daily.     Begin Date    AM    Noon    PM    Bedtime       TYLENOL ORAL   Refills:  0    Instructions:  Take by mouth.     Begin Date    AM    Noon    PM    Bedtime       * Notice:  This list has 2 medication(s) that are the same as other  medications prescribed for you. Read the directions carefully, and ask your doctor or other care provider to review them with you.               Please bring to all follow up appointments:    1. A copy of your discharge instructions.  2. All medicines you are currently taking in their original bottles.  3. Identification and insurance card.    Please arrive 15 minutes ahead of scheduled appointment time.    Please call 24 hours in advance if you must reschedule your appointment and/or time.            Discharge Instructions       Home Care Instructions Pain Management:    1. DIET:   You may resume your normal diet today.   2. BATHING:   You may shower with luke warm water.  3. DRESSING:   You may remove your bandage today.   4. ACTIVITY LEVEL:   You may resume your normal activities 24 hrs after your procedure.  5. MEDICATIONS:   You may resume your normal medications today.   6. SPECIAL INSTRUCTIONS:   No heat to the injection site for 24 hrs including, bath or shower, heating pad, moist heat, or hot tubs.    Use ice pack to injection site for any pain or discomfort.  Apply ice packs for 20 minute intervals as needed.   If you have received any sedatives by mouth today you may not drive for 12 hours.    If you have received any sedation through your IV, you may not drive for 24 hrs.     PLEASE CALL YOUR DOCTOR IF:  1. Redness or swelling around the injection site.  2. Fever of 101 degrees  3. Drainage (pus) from the injection site.  4. For any continuous bleeding (some dried blood over the incision is normal.)    FOR EMERGENCIES:   If any unusual problems or difficulties occur during clinic hours, call [phone] or [phone].         Admission Information     Date & Time Provider Department [facility]    2/15/2017  1:29 PM Theo Mcneill MD [facility] [id number]      Care Providers     Provider Role Specialty Primary office phone    Theo Mcneill MD Attending Provider Pain Medicine [phone]     "Theo Mcneill MD Surgeon  Pain Medicine [phone]      Your Vitals Were     BP Pulse Temp Resp Height Weight    94/55 (BP Location: Right arm, Patient Position: Lying, BP Method: Automatic) 64 98.5 °F (36.9 °C) (Oral) 18 5' 3.5" (1.613 m) 49.9 kg (110 lb)    SpO2 BMI             100% 19.18 kg/m2         Recent Lab Values     No lab values to display.      Allergies as of 2/15/2017        Reactions    Monosodium Glutamate     Pcn [Penicillins]     Sulfa (Sulfonamide Antibiotics)       Advance Directives     An advance directive is a document which, in the event you are no longer able to make decisions for yourself, tells your healthcare team what kind of treatment you do or do not want to receive, or who you would like to make those decisions for you.  If you do not currently have an advance directive, [facility] encourages you to create one.  For more information call:  [phone] ([phone]), [phone] ([phone]),  or log on to [email or web address].        Smoking Cessation     If you would like to quit smoking:   You may be eligible for free services if you are a [place] resident and started smoking cigarettes before September 1, 1988.  Call the Smoking [employer]essation [employer] (SCT) toll free at (061) 076-3981 or (165) 299-1596.   Call 3-057-QUIT-NOW if you do not meet the above criteria.            Language Assistance Services     ATTENTION: Language assistance services are available, free of charge. Please call [phone].      ATENCIÓN: Si habla español, tiene a cobian disposición servicios gratuitos de asistencia lingüística. Llame al [phone].     CHÚ Ý: N?u b?n nói Ti?ng Vi?t, có các d?ch v? h? tr? ngôn ng? mi?n phí dành cho b?n. G?i s? [phone].         [facility] complies with applicable Federal civil rights laws and does not discriminate on the basis of race, color, national origin, age, disability, or sex.        "

## 2017-02-15 NOTE — DISCHARGE INSTRUCTIONS

## 2017-02-15 NOTE — DISCHARGE SUMMARY
Discharge Note  Short Stay      SUMMARY     Admit Date: 2/15/2017    Attending Physician: Theo Mcneill      Discharge Physician: Theo Mcneill      Discharge Date: 2/15/2017 2:47 PM     PROCEDURE:  Right  hip joint injection under fluoroscopy.      PREOPERATIVE DIAGNOSIS:  Right hip arthritis     Disposition: Home or self care    Patient Instructions:   Current Discharge Medication List      CONTINUE these medications which have NOT CHANGED    Details   ACETAMINOPHEN (TYLENOL ORAL) Take by mouth.      alprazolam (XANAX) 0.5 MG tablet Take 0.5 mg by mouth 3 (three) times daily.      oxycodone-acetaminophen (PERCOCET)  mg per tablet Take 1 tablet by mouth every 4 (four) hours as needed for Pain.      topiramate (TOPAMAX) 100 MG tablet Take 100 mg by mouth 2 (two) times daily.      !! aspirin-acetaminophen-caffeine 250-250-65 mg (EXCEDRIN MIGRAINE) 250-250-65 mg per tablet Take 1 tablet by mouth every 6 (six) hours as needed for Pain.      !! ASPIRIN/ACETAMINOPHEN/CAFFEINE (EXCEDRIN MIGRAINE ORAL) Take by mouth.      DOCOSANOL (ABREVA TOP) Apply topically.      hydrocodone-acetaminophen 10-325mg (NORCO)  mg Tab Take by mouth.      tizanidine (ZANAFLEX) 4 MG tablet Take 4 mg by mouth every 6 (six) hours as needed.       !! - Potential duplicate medications found. Please discuss with provider.          Resume home diet and activity

## 2017-02-15 NOTE — PLAN OF CARE
"D/c instructions given to pt. Pt verbalized understanding. Significant other at bedside. Pt stated, " I am dizzy". When trying to assist her she states, "I am fine, please just get the wheelchair I want to leave". Transport assigned to pt.   "

## 2017-02-15 NOTE — OP NOTE
Hip Injection/Arthrogram POSTERIOR APPROACH  Time-out taken to identify patient and procedure side prior to starting      the procedure.                                                                                                                                         PROCEDURE:  Right  hip joint injection under fluoroscopy.      PREOPERATIVE DIAGNOSIS:  Right hip arthritis       POSTOPERATIVE DIAGNOSIS: same    PHYSICIAN: Theo Mcneill MD    Assistants:   Aj Richardson,PGY-3, Anesthesiology resident  I was present and supervising all critical portions of the procedure                                           EBL: None                                                                                                                                                         SPECIMENS: None                                                                               COMPLICATIONS:  None.                                                                                                                                     TECHNIQUE:  With the patient lying in the prone position The area overlying the hip and femoral neck was identified using fluoroscopy.  The area was prepped and draped in the usual sterile fashion.  Local anesthetic was used, given by raising a wheal and    going down to the hub of the 27-gauge needle.  A 5 inch 22-gauge needle was introduced under fluoroscopy until the tip reached the area of the hip joint capsule over the neck of the femur.  When the tip of the needle was thought to be in appropriate position, 1 mL of 300mg/ml radio opaque contrast was injected to confirm proper placement.  4 mL of 0.25% bupivacaine + 1mL of 40mg/mL depomedrol was then injected slowly.  Displacement of the contrast after injection of the medication confirmed that the medication went into the area of the hip joint capsule.  The patient tolerated the procedure well.                                                                                                                                                The patient was monitored for 20-30 minutes after the procedure and was      given post procedure and discharge instructions to follow at home.  The      patient is to follow-up with me in two weeks by calling.   The patient was discharged in a stable condtion.

## 2017-02-27 ENCOUNTER — PATIENT MESSAGE (OUTPATIENT)
Dept: NEUROSURGERY | Facility: CLINIC | Age: 42
End: 2017-02-27

## 2017-02-27 ENCOUNTER — PATIENT MESSAGE (OUTPATIENT)
Dept: SPINE | Facility: CLINIC | Age: 42
End: 2017-02-27

## 2017-03-07 ENCOUNTER — PATIENT MESSAGE (OUTPATIENT)
Dept: SPORTS MEDICINE | Facility: CLINIC | Age: 42
End: 2017-03-07

## 2022-08-08 ENCOUNTER — OFFICE VISIT (OUTPATIENT)
Dept: GYNECOLOGIC ONCOLOGY | Facility: CLINIC | Age: 47
End: 2022-08-08
Payer: COMMERCIAL

## 2022-08-08 DIAGNOSIS — K59.00 CONSTIPATION, UNSPECIFIED CONSTIPATION TYPE: ICD-10-CM

## 2022-08-08 DIAGNOSIS — R35.0 URINARY FREQUENCY: ICD-10-CM

## 2022-08-08 DIAGNOSIS — N93.9 ABNORMAL UTERINE BLEEDING: Primary | ICD-10-CM

## 2022-08-08 PROCEDURE — 99999 PR PBB SHADOW E&M-NEW PATIENT-LVL II: CPT | Mod: PBBFAC,,, | Performed by: OBSTETRICS & GYNECOLOGY

## 2022-08-08 PROCEDURE — 99202 OFFICE O/P NEW SF 15 MIN: CPT | Mod: PBBFAC | Performed by: OBSTETRICS & GYNECOLOGY

## 2022-08-08 PROCEDURE — 99999 PR PBB SHADOW E&M-NEW PATIENT-LVL II: ICD-10-PCS | Mod: PBBFAC,,, | Performed by: OBSTETRICS & GYNECOLOGY

## 2022-08-08 PROCEDURE — 99204 OFFICE O/P NEW MOD 45 MIN: CPT | Mod: S$GLB,,, | Performed by: OBSTETRICS & GYNECOLOGY

## 2022-08-08 PROCEDURE — 99204 PR OFFICE/OUTPT VISIT, NEW, LEVL IV, 45-59 MIN: ICD-10-PCS | Mod: S$GLB,,, | Performed by: OBSTETRICS & GYNECOLOGY

## 2022-08-08 RX ORDER — DEXTROAMPHETAMINE SACCHARATE, AMPHETAMINE ASPARTATE, DEXTROAMPHETAMINE SULFATE AND AMPHETAMINE SULFATE 5; 5; 5; 5 MG/1; MG/1; MG/1; MG/1
1 TABLET ORAL 2 TIMES DAILY PRN
COMMUNITY
Start: 2022-07-22
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
## 2022-08-12 ENCOUNTER — TELEPHONE (OUTPATIENT)
Dept: GYNECOLOGIC ONCOLOGY | Facility: CLINIC | Age: 47
End: 2022-08-12
Payer: COMMERCIAL

## 2022-08-12 NOTE — TELEPHONE ENCOUNTER
----- Message from Elvia Dawkins sent at 8/12/2022  3:52 PM CDT -----  Type: Patient Returning Call      Who Called: Patient   Who Left Message for Patient: NA    Does the patient know what this is regarding?: Patient is confused as to why the doctor is referring her over to a PCP. She says she hasn't seen a PCP in over 8 years and she only sees specialists. Patient also wants to speak about discrepancies on paperwork that she was given (incorrect reason for visit/main concern).     Would the patient rather a call back or a response via [facility]? Call   Best Call Back Number: [phone]  Additional Information: Please assist, thank you!

## 2022-08-17 ENCOUNTER — TELEPHONE (OUTPATIENT)
Dept: GYNECOLOGIC ONCOLOGY | Facility: CLINIC | Age: 47
End: 2022-08-17
Payer: COMMERCIAL

## 2022-08-17 DIAGNOSIS — N93.9 ABNORMAL UTERINE BLEEDING: Primary | ICD-10-CM

## 2022-08-17 DIAGNOSIS — R35.0 URINARY FREQUENCY: ICD-10-CM

## 2022-08-17 DIAGNOSIS — K59.00 CONSTIPATION, UNSPECIFIED CONSTIPATION TYPE: ICD-10-CM

## 2022-08-18 ENCOUNTER — TELEPHONE (OUTPATIENT)
Dept: GYNECOLOGIC ONCOLOGY | Facility: CLINIC | Age: 47
End: 2022-08-18
Payer: COMMERCIAL

## 2022-08-19 ENCOUNTER — HOSPITAL ENCOUNTER (OUTPATIENT)
Dept: PREADMISSION TESTING | Facility: OTHER | Age: 47
Discharge: HOME | End: 2022-08-19
Attending: OBSTETRICS & GYNECOLOGY
Payer: COMMERCIAL

## 2022-08-19 VITALS
SYSTOLIC BLOOD PRESSURE: 107 MMHG | TEMPERATURE: 98 F | HEIGHT: 64 IN | HEART RATE: 90 BPM | DIASTOLIC BLOOD PRESSURE: 53 MMHG | RESPIRATION RATE: 16 BRPM | BODY MASS INDEX: 21.07 KG/M2 | OXYGEN SATURATION: 100 % | WEIGHT: 123.44 LBS

## 2022-08-19 RX ORDER — TOPIRAMATE 200 MG/1
200 TABLET ORAL 3 TIMES DAILY
COMMUNITY

## 2022-08-19 RX ORDER — ACETAMINOPHEN 325 MG/1
650 TABLET ORAL
Status: CANCELLED | OUTPATIENT
Start: 2022-08-19 | End: 2022-08-19

## 2022-08-19 RX ORDER — SODIUM CHLORIDE, SODIUM LACTATE, POTASSIUM CHLORIDE, CALCIUM CHLORIDE 600; 310; 30; 20 MG/100ML; MG/100ML; MG/100ML; MG/100ML
INJECTION, SOLUTION INTRAVENOUS CONTINUOUS
Status: CANCELLED | OUTPATIENT
Start: 2022-08-19

## 2022-08-19 RX ORDER — LIDOCAINE HYDROCHLORIDE 10 MG/ML
0.5 INJECTION, SOLUTION EPIDURAL; INFILTRATION; INTRACAUDAL; PERINEURAL ONCE
Status: CANCELLED | OUTPATIENT
Start: 2022-08-19 | End: 2022-08-19

## 2022-08-19 NOTE — DISCHARGE INSTRUCTIONS
Information to Prepare you for your Surgery    PRE-ADMIT TESTING -  [phone]    [address] [facility]          Your surgery has been scheduled at [facility]. We are pleased to have the opportunity to serve you. For Further Information please call [phone].    On the day of surgery please report to the Information Desk on the 1st floor.    CONTACT YOUR PHYSICIAN'S OFFICE THE DAY PRIOR TO YOUR SURGERY TO OBTAIN YOUR ARRIVAL TIME.     The evening before surgery do not eat anything after 9 p.m. ( this includes hard candy, chewing gum and mints).  You may only have GATORADE, POWERADE AND WATER  from 9 p.m. until you leave your home.   DO NOT DRINK ANY LIQUIDS ON THE WAY TO THE HOSPITAL.      Why does your anesthesiologist allow you to drink Gatorade/Powerade before surgery?  Gatorade/Powerade helps to increase your comfort before surgery and to decrease your nausea after surgery. The carbohydrates in Gatorade/Powerade help reduce your body's stress response to surgery.  If you are a diabetic-drink only water prior to surgery.      Current Visitor policy(12/27/2021) - Patients may have 2 visitors pre and post procedure. Only 2 visitors will be allowed in the Surgical building with the patient.     SPECIAL MEDICATION INSTRUCTIONS: TAKE medications checked off by the Anesthesiologist on your Medication List.    Surgery Patients:  If you take ASPIRIN - Your PHYSICIAN/SURGEON will need to inform you IF/OR when you need to stop taking aspirin prior to your surgery.     Do Not take any medications containing IBUPROFEN.    Do Not Wear any make-up (especially eye make-up) to surgery. Please remove any false eyelashes or eyelash extensions. If you arrive the day of surgery with makeup/eyelashes on you will be required to remove prior to surgery. (There is a risk of corneal abrasions if eye makeup/eyelash extensions are not removed)      Leave all valuables at home.   Do Not wear any  jewelry or watches, including any metal in body piercings. Jewelry must be removed prior to coming to the hospital.  There is a possibility that rings that are unable to be removed may be cut off if they are on the surgical extremity.    Please remove all hair extensions, wigs, clips and any other metal accessories/ ornaments from your hair.  These items may pose a flammable/fire risk in Surgery and must be removed.    Do not shave your surgical area at least 5 days prior to your surgery. The surgical prep will be performed at the hospital according to Infection Control regulations.    Contact Lens must be removed before surgery. Either do not wear the contact lens or bring a case and solution for storage.  Please bring a container for eyeglasses or dentures as required.  Bring any paperwork your physician has provided, such as consent forms,  history and physicals, doctor's orders, etc.   Bring comfortable clothes that are loose fitting to wear upon discharge. Take into consideration the type of surgery being performed.  Maintain your diet as advised per your physician the day prior to surgery.      Adequate rest the night before surgery is advised.   Park in the Parking lot behind the hospital or in the [employer] [facility] across the street from the parking lot. Parking is complimentary.  If you will be discharged the same day as your procedure, please arrange for a responsible adult to drive you home or to accompany you if traveling by taxi.   YOU WILL NOT BE PERMITTED TO DRIVE OR TO LEAVE THE HOSPITAL ALONE AFTER SURGERY.   If you are being discharged the same day, it is strongly recommended that you arrange for someone to remain with you for the first 24 hrs following your surgery.    The Surgeon will speak to your family/visitor after your surgery regarding the outcome of your surgery and post op care.  The Surgeon may speak to you after your surgery, but there is a possibility you may not remember the  details.  Please check with your family members regarding the conversation with the Surgeon.    We strongly recommend whoever is bringing you home be present for discharge instructions.  This will ensure a thorough understanding for your post op home care.    ALL CHILDREN MUST ALWAYS BE ACCOMPANIED BY AN ADULT.    Visitors-Refer to current Visitor policy handouts.    Thank you for your cooperation.  The Staff of [facility].            Bathing Instructions with Hibiclens    Shower the evening before and morning of your procedure with Hibiclens:  Wash your face with water and your regular face wash/soap  Apply Hibiclens directly on your skin or on a wet washcloth and wash gently. When showering: Move away from the shower stream when applying Hibiclens to avoid rinsing off too soon.  Rinse thoroughly with warm water  Do not dilute Hibiclens        Dry off as usual, do not use any deodorant, powder, body lotions, perfume, after shave or cologne.

## 2022-08-22 ENCOUNTER — TELEPHONE (OUTPATIENT)
Dept: GYNECOLOGIC ONCOLOGY | Facility: CLINIC | Age: 47
End: 2022-08-22
Payer: COMMERCIAL

## 2022-08-22 NOTE — TELEPHONE ENCOUNTER
Attempted to return pt's call regarding her decision to cancel surgery. Pt did not answer. LM to call office

## 2022-08-22 NOTE — TELEPHONE ENCOUNTER
----- Message from Jojo Trujillo sent at 8/22/2022  1:29 PM CDT -----  Name Of Caller: Rosalinda     Provider Name: Fab Bingham    Does patient feel the need to be seen today?    Relationship to the Pt?: pt     Contact Preference?: [phone]    What is the nature of the call?:Pt called and said she would like to cancel surgery please call her back  to let her know it was cancel. She spoke to billing and was upset when she called

## 2022-12-13 ENCOUNTER — HOSPITAL ENCOUNTER (EMERGENCY)
Facility: OTHER | Age: 47
Discharge: HOME | End: 2022-12-13
Attending: EMERGENCY MEDICINE
Payer: MEDICARE

## 2022-12-13 VITALS
WEIGHT: 105 LBS | OXYGEN SATURATION: 98 % | HEIGHT: 63 IN | RESPIRATION RATE: 18 BRPM | TEMPERATURE: 98 F | DIASTOLIC BLOOD PRESSURE: 54 MMHG | BODY MASS INDEX: 18.61 KG/M2 | SYSTOLIC BLOOD PRESSURE: 101 MMHG | HEART RATE: 70 BPM

## 2022-12-13 DIAGNOSIS — S99.919A ANKLE INJURY: ICD-10-CM

## 2022-12-13 DIAGNOSIS — S93.402A SPRAIN OF LEFT ANKLE, UNSPECIFIED LIGAMENT, INITIAL ENCOUNTER: Primary | ICD-10-CM

## 2022-12-13 DIAGNOSIS — S93.401A SPRAIN OF RIGHT ANKLE, UNSPECIFIED LIGAMENT, INITIAL ENCOUNTER: ICD-10-CM

## 2022-12-13 DIAGNOSIS — M79.673 FOOT PAIN: ICD-10-CM

## 2022-12-13 PROCEDURE — 25000003 PHARM REV CODE 250: Performed by: EMERGENCY MEDICINE

## 2022-12-13 PROCEDURE — 99284 EMERGENCY DEPT VISIT MOD MDM: CPT

## 2022-12-13 PROCEDURE — 96372 THER/PROPH/DIAG INJ SC/IM: CPT | Performed by: EMERGENCY MEDICINE

## 2022-12-13 PROCEDURE — 63600175 PHARM REV CODE 636 W HCPCS: Performed by: EMERGENCY MEDICINE

## 2022-12-13 RX ORDER — IBUPROFEN 600 MG/1
600 TABLET ORAL EVERY 6 HOURS PRN
Qty: 20 TABLET | Refills: 0 | Status: SHIPPED | OUTPATIENT
Start: 2022-12-13

## 2022-12-13 RX ORDER — KETOROLAC TROMETHAMINE 30 MG/ML
30 INJECTION, SOLUTION INTRAMUSCULAR; INTRAVENOUS
Status: COMPLETED | OUTPATIENT
Start: 2022-12-13 | End: 2022-12-13

## 2022-12-13 RX ORDER — OXYCODONE AND ACETAMINOPHEN 5; 325 MG/1; MG/1
2 TABLET ORAL
Status: COMPLETED | OUTPATIENT
Start: 2022-12-13 | End: 2022-12-13

## 2022-12-13 RX ADMIN — KETOROLAC TROMETHAMINE 30 MG: 30 INJECTION, SOLUTION INTRAMUSCULAR; INTRAVENOUS at 03:12

## 2022-12-13 RX ADMIN — OXYCODONE HYDROCHLORIDE AND ACETAMINOPHEN 2 TABLET: 5; 325 TABLET ORAL at 03:12
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
## 2022-12-13 NOTE — ED PROVIDER NOTES
"SCRIBE #1 NOTE: I, Jhon Ríos, am scribing for, and in the presence of,  Kiran Espinosa DO. I have scribed the following portions of the note - Other sections scribed: HPI, ROS,  PE.       Source of History:  The patient    Chief complaint:  Ankle Pain (Patient to ED with c/o bilateral ankle pain s/p incident with horse yesterday afternoon . )      HPI:  Rosalinda Gomes is a 47 y.o. female presenting with bilateral foot and ankle pain starting last night. [place] She reports that a horse was spooked last night and trampled her feet. Patient states that she could not walk this morning due to the pain, which she describes as "cellulitis in her ankles." She notes associated ankle swelling and "pins and needles" on the bilateral lower extremities. Patient reports putting ice on the ankles with no relief. She also states that she took Tylenol with no improvement. Patient notes that she called a Ly[employer] this morning and was carried into and out of the [employer] by a . She also states that she has prescribed pain medication, but she did not take it since the medication could not be transferred from [place].    This is the extent to the patients complaints today here in the emergency department.    ROS: As per HPI and below:  Constitutional: No fever.  No chills.  Eyes: No visual changes.   ENT: No sore throat. No ear pain.  Urinary: No abnormal urination.  MSK: Notes arthralgias and myalgias. Notes joint swelling.  Neuro: Notes paresthesias.  Integument: No rashes or lesions.    Review of patient's allergies indicates:   Allergen Reactions    Monosodium glutamate     Pcn [penicillins]     Sulfa (sulfonamide antibiotics)        PMH:  As per HPI and below:  Past Medical History:   Diagnosis Date    Anemia     Depression     Head injury due to trauma 2014    Memory deficit     Pedal edema     lower ext    Seizures     6 months     " "Shoulder pain      Past Surgical History:   Procedure Laterality Date    BRAIN SURGERY      COSMETIC SURGERY      HAND SURGERY      HIP ARTHROSCOPY W/ LABRAL REPAIR N/A     SHOULDER ARTHROSCOPY         Social History     Tobacco Use    Smoking status: Former    Smokeless tobacco: Never   Substance Use Topics    Alcohol use: Never       Physical Exam:    /60 (BP Location: Right arm, Patient Position: Sitting)   Pulse 84   Temp 98.1 °F (36.7 °C) (Oral)   Resp 17   Ht 5' 3" (1.6 m)   Wt 47.6 kg (105 lb)   SpO2 98%   BMI 18.60 kg/m²   Nursing note and vital signs reviewed.  Constitutional: No acute distress.  Nontoxic  Head:  Normocephalic atraumatic  Eyes: No conjunctival injection.  Extraocular muscles are intact.  ENT: Normal phonation.  Musculoskeletal: Diffuse tenderness over bilateral feet and ankles. Lateral swelling on left posterior lateral malleolus. Negative for Blancas's test. No ecchymosis. No obvious deformities. 2+ dorsalis pedis pulse bilaterally.  Skin: No rashes seen.  Good turgor.  No abrasions.  No ecchymoses.  Psych: Appropriate, conversant.        I decided to obtain the patient's medical records.  Summary of Medical Records:      MDM/ Differential Dx:   47 y.o. female with bilateral ankle pain that occurred yesterday.  Differential would include fracture versus dislocation versus Achilles tendon rupture.  Based on my physical exam no evidence of Achilles tendon rupture.  Will review x-rays and plan for discharge    ED Course as of 12/13/22 1527   Tue Dec 13, 2022   1428 X-Ray Foot Complete Right  X-ray of the right foot independently interpreted by myself shows no fracture dislocation [SM]   1430 X-Ray Foot Complete Left  X-ray of the left foot independently interpreted by myself shows no fracture or dislocation. [SM]   1431 X-Ray Ankle Complete Right  X-ray of the right ankle independently interpreted by myself shows no fracture dislocation [SM]   1431 X-Ray Ankle Complete " Left  X-ray of the left ankle independently interpreted by myself shows no fracture or dislocation [SM]   1517 No further workup is indicated in the emergency department today.  I updated pt regarding results and I counseled pt regarding supportive care measures.  Diagnosis and treatment plan explained to patient. I have answered all questions and the patient is satisfied with the plan of care. Patient discharged home in stable condition.  [SM]      ED Course User Index  [SM] Kiran Espinosa DO              Scribe Attestation:   Scribe #1: I performed the above scribed service and the documentation accurately describes the services I performed. I attest to the accuracy of the note.    Physician Attestation for Scribe: I, Dr Kiran Espinosa, reviewed documentation as scribed in my presence, which is both accurate and complete.    Diagnostic Impression:    1. Sprain of left ankle, unspecified ligament, initial encounter    2. Ankle injury    3. Foot pain    4. Sprain of right ankle, unspecified ligament, initial encounter         ED Disposition Condition    Discharge Stable            ED Prescriptions       Medication Sig Dispense Start Date End Date Auth. Provider    ibuprofen (ADVIL,MOTRIN) 600 MG tablet Take 1 tablet (600 mg total) by mouth every 6 (six) hours as needed for Pain. 20 tablet 12/13/2022 -- Kiran Espinosa DO          Follow-up Information       Follow up With Specialties Details Why Contact Info    Enoc Kraus MD Family Medicine Schedule an appointment as soon as possible for a visit in 1 week  [address] [place] [address] [place] [id number]  [phone]               Kiran Espinosa DO  12/13/22 9879

## 2022-12-13 NOTE — FIRST PROVIDER EVALUATION
Emergency Department TeleTriage Encounter Note      CHIEF COMPLAINT    Chief Complaint   Patient presents with    Ankle Pain     Patient to ED with c/o bilateral ankle pain s/p incident with horse yesterday afternoon .        VITAL SIGNS   Initial Vitals [12/13/22 1253]   BP Pulse Resp Temp SpO2   103/60 84 16 98.1 °F (36.7 °C) 98 %      MAP       --            ALLERGIES    Review of patient's allergies indicates:   Allergen Reactions    Monosodium glutamate     Pcn [penicillins]     Sulfa (sulfonamide antibiotics)        PROVIDER TRIAGE NOTE  This is a teletriage evaluation of a 47 y.o. female presenting to the ED complaining of ankle pain. Patient reports bilateral ankle and feet pain after being stepped on by a horse yesterday. She has increased pain with weight bearing and is unable to ambulate without assistance.     Initial orders will be placed and care will be transferred to an alternate provider when patient is roomed for a full evaluation. Any additional orders and the final disposition will be determined by that provider.         ORDERS  Labs Reviewed - No data to display    ED Orders (720h ago, onward)      Start Ordered     Status Ordering Provider    12/13/22 1318 12/13/22 1317  X-Ray Foot Complete Left  1 time imaging         Ordered SABA GARVIN.    12/13/22 1318 12/13/22 1317  X-Ray Foot Complete Right  1 time imaging         Ordered SABA GARVIN.    12/13/22 1318 12/13/22 1317  Apply ice to affected area  Once         Ordered SABA GARVIN G.    12/13/22 1317 12/13/22 1317  X-Ray Ankle Complete Left  1 time imaging         Ordered SABA GARVIN.    12/13/22 1317 12/13/22 1317  X-Ray Ankle Complete Right  1 time imaging         Ordered SABA GARVIN.              Virtual Visit Note: The provider triage portion of this emergency department evaluation and documentation was performed via [employer], a HIPAA-compliant telemedicine application, in concert with a tele-presenter in the room. A face  to face patient evaluation with one of my colleagues will occur once the patient is placed in an emergency department room.      DISCLAIMER: This note was prepared with [employer] voice recognition transcription software. Garbled syntax, mangled pronouns, and other bizarre constructions may be attributed to that software system.

## 2023-05-17 PROBLEM — K44.9 HIATAL HERNIA: Status: ACTIVE | Noted: 2023-05-17

## 2023-05-17 PROBLEM — N39.0 URINARY TRACT INFECTION WITHOUT HEMATURIA: Status: ACTIVE | Noted: 2023-05-17

## 2023-05-17 PROBLEM — R11.2 NAUSEA AND VOMITING: Status: ACTIVE | Noted: 2023-05-17

## 2023-05-17 PROBLEM — R10.9 ABDOMINAL PAIN: Status: ACTIVE | Noted: 2023-05-17

## 2023-06-22 ENCOUNTER — TELEPHONE (OUTPATIENT)
Dept: NEUROLOGY | Facility: CLINIC | Age: 48
End: 2023-06-22
Payer: MEDICARE

## 2023-06-22 NOTE — TELEPHONE ENCOUNTER
----- Message from Alondra Anderson sent at 4/24/2023  3:44 PM CDT -----  Regarding: Appt / Refill  Contact: Pt [phone]  Patient requesting an appt for as soon as possible states she has been without medication for a few weeks would like to discuss options for refills

## 2023-08-21 PROBLEM — N39.0 URINARY TRACT INFECTION WITHOUT HEMATURIA: Status: RESOLVED | Noted: 2023-05-17 | Resolved: 2023-08-21

## 2024-08-02 ENCOUNTER — TELEPHONE (OUTPATIENT)
Dept: FAMILY MEDICINE | Facility: CLINIC | Age: 49
End: 2024-08-02

## 2024-08-02 ENCOUNTER — OFFICE VISIT (OUTPATIENT)
Dept: FAMILY MEDICINE | Facility: CLINIC | Age: 49
End: 2024-08-02
Payer: MEDICARE

## 2024-08-02 ENCOUNTER — HOSPITAL ENCOUNTER (OUTPATIENT)
Dept: RADIOLOGY | Facility: HOSPITAL | Age: 49
Discharge: HOME | End: 2024-08-02
Attending: FAMILY MEDICINE
Payer: MEDICARE

## 2024-08-02 VITALS
HEART RATE: 102 BPM | DIASTOLIC BLOOD PRESSURE: 82 MMHG | SYSTOLIC BLOOD PRESSURE: 130 MMHG | BODY MASS INDEX: 25.33 KG/M2 | HEIGHT: 63 IN | TEMPERATURE: 98 F | OXYGEN SATURATION: 100 % | WEIGHT: 142.94 LBS

## 2024-08-02 DIAGNOSIS — M41.9 SCOLIOSIS, UNSPECIFIED SCOLIOSIS TYPE, UNSPECIFIED SPINAL REGION: Primary | ICD-10-CM

## 2024-08-02 DIAGNOSIS — K44.9 HIATAL HERNIA: ICD-10-CM

## 2024-08-02 DIAGNOSIS — Z09 NEED FOR CASE MANAGEMENT FOLLOW-UP: Primary | ICD-10-CM

## 2024-08-02 DIAGNOSIS — M41.9 SCOLIOSIS, UNSPECIFIED SCOLIOSIS TYPE, UNSPECIFIED SPINAL REGION: ICD-10-CM

## 2024-08-02 DIAGNOSIS — R79.9 ABNORMAL FINDING OF BLOOD CHEMISTRY, UNSPECIFIED: ICD-10-CM

## 2024-08-02 DIAGNOSIS — R56.9 SEIZURE: ICD-10-CM

## 2024-08-02 PROCEDURE — 72080 X-RAY EXAM THORACOLMB 2/> VW: CPT | Mod: TC,PO

## 2024-08-02 PROCEDURE — 72080 X-RAY EXAM THORACOLMB 2/> VW: CPT | Mod: 26,,, | Performed by: RADIOLOGY

## 2024-08-02 PROCEDURE — 99999 PR PBB SHADOW E&M-EST. PATIENT-LVL V: CPT | Mod: PBBFAC,,, | Performed by: FAMILY MEDICINE

## 2024-08-02 RX ORDER — HYDROCODONE BITARTRATE AND ACETAMINOPHEN 7.5; 325 MG/1; MG/1
1 TABLET ORAL EVERY 6 HOURS PRN
Qty: 20 TABLET | Refills: 0 | Status: SHIPPED | OUTPATIENT
Start: 2024-08-02

## 2024-08-02 RX ORDER — OXYCODONE AND ACETAMINOPHEN 10; 325 MG/1; MG/1
1 TABLET ORAL EVERY 6 HOURS PRN
COMMUNITY
End: 2024-08-02

## 2024-08-02 RX ORDER — OMEPRAZOLE 40 MG/1
40 CAPSULE, DELAYED RELEASE ORAL
Qty: 60 CAPSULE | Refills: 3 | Status: SHIPPED | OUTPATIENT
Start: 2024-08-02

## 2024-08-02 RX ORDER — TOPIRAMATE 200 MG/1
200 TABLET ORAL 3 TIMES DAILY
Qty: 90 TABLET | Refills: 1 | Status: SHIPPED | OUTPATIENT
Start: 2024-08-02

## 2024-08-02 NOTE — PROGRESS NOTES
Subjective:       Patient ID: Rosalinda Gomes is a 49 y.o. female.    Chief Complaint: Establish Care      HPI Comments:       Current Outpatient Medications:     dextroamphetamine-amphetamine (ADDERALL) 20 mg tablet, Take 1 tablet by mouth 2 (two) times daily as needed., Disp: , Rfl:     ibuprofen (ADVIL,MOTRIN) 600 MG tablet, Take 1 tablet (600 mg total) by mouth every 6 (six) hours as needed for Pain., Disp: 20 tablet, Rfl: 0    ondansetron (ZOFRAN) 4 MG tablet, Take 1 tablet (4 mg total) by mouth every 6 (six) hours., Disp: 12 tablet, Rfl: 0    HYDROcodone-acetaminophen (NORCO) 7.5-325 mg per tablet, Take 1 tablet by mouth every 6 (six) hours as needed for Pain., Disp: 20 tablet, Rfl: 0    omeprazole (PRILOSEC) 40 MG capsule, Take 1 capsule (40 mg total) by mouth 2 (two) times daily before meals., Disp: 60 capsule, Rfl: 3    topiramate (TOPAMAX) 200 MG Tab, Take 1 tablet (200 mg total) by mouth 3 (three) times daily., Disp: 90 tablet, Rfl: 1      This my 1st time seeing this patient.  She was interviewed by me and also extensively by my , Nereida Caro.    Apparently she recently moved back to this area from [place] about 2 months ago.    She used to be on 3-5 Percocets per day until December when it was abruptly discontinued.    She complains of pain in her spine.  She has past evidence of scoliosis.  She says her curvature has been getting worse..    Currently she is taking Tylenol.  Can not tolerate NSAIDs because of a large hiatal hernia      Review of Systems   Constitutional:  Negative for activity change, appetite change and fever.   HENT:  Negative for sore throat.    Respiratory:  Negative for cough and shortness of breath.    Cardiovascular:  Negative for chest pain.   Gastrointestinal:  Negative for abdominal pain, diarrhea and nausea.   Genitourinary:  Negative for difficulty urinating.   Musculoskeletal:  Positive for back pain. Negative for arthralgias and myalgias.  "  Neurological:  Negative for dizziness and headaches.   Psychiatric/Behavioral:  The patient is nervous/anxious.        Objective:      Vitals:    08/02/24 1246   BP: 130/82   Pulse: 102   Temp: 97.8 °F (36.6 °C)   TempSrc: Tympanic   SpO2: 100%   Weight: 64.8 kg (142 lb 15.5 oz)   Height: 5' 3" (1.6 m)   PainSc:   8   PainLoc: Leg     Physical Exam  Vitals and nursing note reviewed.   Constitutional:       General: She is not in acute distress.     Appearance: She is well-developed. She is not diaphoretic.   HENT:      Head: Normocephalic.   Neck:      Thyroid: No thyromegaly.   Cardiovascular:      Rate and Rhythm: Normal rate and regular rhythm.      Heart sounds: Normal heart sounds. No murmur heard.  Pulmonary:      Effort: Pulmonary effort is normal.      Breath sounds: Normal breath sounds. No wheezing or rales.   Abdominal:      General: There is no distension.      Palpations: Abdomen is soft.   Musculoskeletal:      Cervical back: Neck supple.   Lymphadenopathy:      Cervical: No cervical adenopathy.   Skin:     General: Skin is warm and dry.   Neurological:      Mental Status: She is alert and oriented to person, place, and time.   Psychiatric:         Mood and Affect: Mood is anxious.         Speech: Speech is tangential.         Behavior: Behavior normal.         Thought Content: Thought content normal.         Judgment: Judgment normal.         Assessment:       1. Scoliosis, unspecified scoliosis type, unspecified spinal region    2. Seizure    3. Hiatal hernia    4. Abnormal finding of blood chemistry, unspecified        Plan:   Scoliosis, unspecified scoliosis type, unspecified spinal region  Comments:  Spine x-rays followed by MRI.  Consult neuro surgery and pain management  Orders:  -     Ambulatory referral/consult to Neurosurgery; Future; Expected date: 08/09/2024  -     MRI Spine Cervical-Thoracic-Lumbar W W/O Contrast (XPD); Future; Expected date: 08/02/2024  -     Ambulatory referral/consult " to Pain Clinic; Future; Expected date: 08/09/2024  -     Ambulatory referral/consult to Neurology; Future; Expected date: 08/09/2024  -     X-Ray Cervical Spine AP And Lateral; Future; Expected date: 08/02/2024  -     X-Ray Thoracolumbar Spine AP Lateral; Future; Expected date: 08/02/2024  -     Ambulatory referral/consult to Neurosurgery; Future; Expected date: 08/09/2024    Seizure  Comments:  Refill Topamax.  Neurology consult  Orders:  -     CBC Auto Differential; Future; Expected date: 08/02/2024  -     Comprehensive Metabolic Panel; Future; Expected date: 08/02/2024  -     Hemoglobin A1C; Future; Expected date: 08/02/2024  -     Hepatitis C Antibody; Future; Expected date: 08/02/2024  -     HIV 1/2 Ag/Ab (4th Gen); Future; Expected date: 08/02/2024  -     TSH; Future; Expected date: 08/02/2024  -     topiramate (TOPAMAX) 200 MG Tab; Take 1 tablet (200 mg total) by mouth 3 (three) times daily.  Dispense: 90 tablet; Refill: 1  -     Ambulatory referral/consult to Neurology; Future; Expected date: 08/09/2024    Hiatal hernia  Comments:  Says surgical option was pursued but canceled by COVID    Abnormal finding of blood chemistry, unspecified  -     Hemoglobin A1C; Future; Expected date: 08/02/2024    Other orders  -     omeprazole (PRILOSEC) 40 MG capsule; Take 1 capsule (40 mg total) by mouth 2 (two) times daily before meals.  Dispense: 60 capsule; Refill: 3  -     HYDROcodone-acetaminophen (NORCO) 7.5-325 mg per tablet; Take 1 tablet by mouth every 6 (six) hours as needed for Pain.  Dispense: 20 tablet; Refill: 0

## 2024-08-05 ENCOUNTER — HOSPITAL ENCOUNTER (OUTPATIENT)
Dept: RADIOLOGY | Facility: HOSPITAL | Age: 49
Discharge: HOME | End: 2024-08-05
Attending: FAMILY MEDICINE
Payer: MEDICARE

## 2024-08-05 ENCOUNTER — PATIENT MESSAGE (OUTPATIENT)
Dept: FAMILY MEDICINE | Facility: CLINIC | Age: 49
End: 2024-08-05
Payer: MEDICARE

## 2024-08-05 ENCOUNTER — TELEPHONE (OUTPATIENT)
Dept: FAMILY MEDICINE | Facility: CLINIC | Age: 49
End: 2024-08-05
Payer: MEDICARE

## 2024-08-05 DIAGNOSIS — M47.816 LUMBAR FACET ARTHROPATHY: Primary | ICD-10-CM

## 2024-08-05 PROCEDURE — 72040 X-RAY EXAM NECK SPINE 2-3 VW: CPT | Mod: 26,,, | Performed by: RADIOLOGY

## 2024-08-05 PROCEDURE — 72040 X-RAY EXAM NECK SPINE 2-3 VW: CPT | Mod: TC,FY,PO

## 2024-08-07 ENCOUNTER — TELEPHONE (OUTPATIENT)
Dept: FAMILY MEDICINE | Facility: CLINIC | Age: 49
End: 2024-08-07
Payer: MEDICARE

## 2024-08-08 ENCOUNTER — PATIENT OUTREACH (OUTPATIENT)
Dept: ADMINISTRATIVE | Facility: OTHER | Age: 49
End: 2024-08-08
Payer: MEDICARE

## 2024-08-09 ENCOUNTER — TELEPHONE (OUTPATIENT)
Dept: FAMILY MEDICINE | Facility: CLINIC | Age: 49
End: 2024-08-09
Payer: MEDICARE

## 2024-08-09 DIAGNOSIS — E05.90 HYPERTHYROIDISM: Primary | ICD-10-CM

## 2024-08-13 ENCOUNTER — TELEPHONE (OUTPATIENT)
Dept: FAMILY MEDICINE | Facility: CLINIC | Age: 49
End: 2024-08-13
Payer: MEDICARE

## 2024-08-13 DIAGNOSIS — E05.90 HYPERTHYROIDISM: Primary | ICD-10-CM

## 2024-08-13 NOTE — TELEPHONE ENCOUNTER
----- Message from Eamon Thorne MD sent at 8/9/2024  3:59 AM CDT -----  You have overactive thyroid.  You need to be seen by an endocrinologist.  Referral placed.  Let me know if there is any delay in getting her in quickly, and then we will go external

## 2024-08-14 NOTE — TELEPHONE ENCOUNTER
Pt would like external referral to Dr. Elvira Flower with [facility]/ [facility].  External referral pended.  Fax number to send referral to is:    [phone]

## 2024-08-16 NOTE — ADDENDUM NOTE
Addended by: GUSTABO ANSARI on: 8/16/2024 12:03 PM     Modules accepted: Orders     PRE-OP DATA and Check List - GI:  Procedure: Colonoscopy (36254)  and EGD (39955)  Diagnosis:   Iron deficiency anemia, unspecified iron deficiency anemia type  (primary encounter diagnosis)  Fecal occult blood test positive  Tentative Date: June  Anesthesia: MAC (needs clearance from Dr. Erickson)     Medications:  - Hold Losartan-hydrochlorothiazide 24 hours prior to the procedure      Note     Pt will need pre-op exam with PCP prior to procedure.  BMI 42.35     Cardiac clearance necessary.      May 5, 2021  Me  to Sejal Mcdaniel RN    PS    12:07 PM  Will patient need a pre op before procedure? Thank you!

## 2024-08-27 ENCOUNTER — OFFICE VISIT (OUTPATIENT)
Dept: FAMILY MEDICINE | Facility: CLINIC | Age: 49
End: 2024-08-27
Payer: MEDICARE

## 2024-08-27 VITALS
HEIGHT: 63 IN | TEMPERATURE: 98 F | SYSTOLIC BLOOD PRESSURE: 122 MMHG | DIASTOLIC BLOOD PRESSURE: 62 MMHG | BODY MASS INDEX: 25.16 KG/M2 | WEIGHT: 142 LBS | HEART RATE: 99 BPM | OXYGEN SATURATION: 99 %

## 2024-08-27 DIAGNOSIS — M54.9 BACK PAIN, UNSPECIFIED BACK LOCATION, UNSPECIFIED BACK PAIN LATERALITY, UNSPECIFIED CHRONICITY: ICD-10-CM

## 2024-08-27 DIAGNOSIS — E05.90 HYPERTHYROIDISM: Primary | ICD-10-CM

## 2024-08-27 DIAGNOSIS — R19.00 PELVIC MASS: ICD-10-CM

## 2024-08-27 PROCEDURE — 3074F SYST BP LT 130 MM HG: CPT | Mod: CPTII,S$GLB,, | Performed by: FAMILY MEDICINE

## 2024-08-27 PROCEDURE — 99214 OFFICE O/P EST MOD 30 MIN: CPT | Mod: 25,S$GLB,, | Performed by: FAMILY MEDICINE

## 2024-08-27 PROCEDURE — 3008F BODY MASS INDEX DOCD: CPT | Mod: CPTII,S$GLB,, | Performed by: FAMILY MEDICINE

## 2024-08-27 PROCEDURE — 3044F HG A1C LEVEL LT 7.0%: CPT | Mod: CPTII,S$GLB,, | Performed by: FAMILY MEDICINE

## 2024-08-27 PROCEDURE — 1159F MED LIST DOCD IN RCRD: CPT | Mod: CPTII,S$GLB,, | Performed by: FAMILY MEDICINE

## 2024-08-27 PROCEDURE — 99999 PR PBB SHADOW E&M-EST. PATIENT-LVL III: CPT | Mod: PBBFAC,,, | Performed by: FAMILY MEDICINE

## 2024-08-27 PROCEDURE — 96372 THER/PROPH/DIAG INJ SC/IM: CPT | Mod: S$GLB,,, | Performed by: FAMILY MEDICINE

## 2024-08-27 PROCEDURE — 3078F DIAST BP <80 MM HG: CPT | Mod: CPTII,S$GLB,, | Performed by: FAMILY MEDICINE

## 2024-08-27 RX ORDER — KETOROLAC TROMETHAMINE 30 MG/ML
60 INJECTION, SOLUTION INTRAMUSCULAR; INTRAVENOUS
Status: COMPLETED | OUTPATIENT
Start: 2024-08-27 | End: 2024-08-27

## 2024-08-27 RX ORDER — KETOROLAC TROMETHAMINE 30 MG/ML
30 INJECTION, SOLUTION INTRAMUSCULAR; INTRAVENOUS
Status: DISCONTINUED | OUTPATIENT
Start: 2024-08-27 | End: 2024-08-27

## 2024-08-27 RX ORDER — ATENOLOL 25 MG/1
25 TABLET ORAL DAILY
Qty: 30 TABLET | Refills: 1 | Status: SHIPPED | OUTPATIENT
Start: 2024-08-27 | End: 2025-08-27

## 2024-08-27 RX ADMIN — KETOROLAC TROMETHAMINE 60 MG: 30 INJECTION, SOLUTION INTRAMUSCULAR; INTRAVENOUS at 10:08

## 2024-08-27 NOTE — PROGRESS NOTES
Subjective:       Patient ID: Rosalinda Gomes is a 49 y.o. female.    Chief Complaint: No chief complaint on file.      HPI Comments:       Current Outpatient Medications:     dextroamphetamine-amphetamine (ADDERALL) 20 mg tablet, Take 1 tablet by mouth 2 (two) times daily as needed., Disp: , Rfl:     HYDROcodone-acetaminophen (NORCO) 7.5-325 mg per tablet, Take 1 tablet by mouth every 6 (six) hours as needed for Pain., Disp: 20 tablet, Rfl: 0    ibuprofen (ADVIL,MOTRIN) 600 MG tablet, Take 1 tablet (600 mg total) by mouth every 6 (six) hours as needed for Pain., Disp: 20 tablet, Rfl: 0    omeprazole (PRILOSEC) 40 MG capsule, Take 1 capsule (40 mg total) by mouth 2 (two) times daily before meals., Disp: 60 capsule, Rfl: 3    ondansetron (ZOFRAN) 4 MG tablet, Take 1 tablet (4 mg total) by mouth every 6 (six) hours., Disp: 12 tablet, Rfl: 0    topiramate (TOPAMAX) 200 MG Tab, Take 1 tablet (200 mg total) by mouth 3 (three) times daily., Disp: 90 tablet, Rfl: 1    atenoloL (TENORMIN) 25 MG tablet, Take 1 tablet (25 mg total) by mouth once daily. Take every day, For control of symptoms associated with overactive thyroid, Disp: 30 tablet, Rfl: 1    Current Facility-Administered Medications:     ketorolac injection 30 mg, 30 mg, Intramuscular, 1 time in Clinic/HOD,       This is our initial follow-up visit.  She establish care with me about 4 weeks ago.    The time she was right agitated and frustrated by the healthcare system.  She was wanting to get help with back pain.  I gave her a short course of pain medication and she landed at the [facility] where she saw a neck specialist who referred her to a spine specialist.  She has an appointment tomorrow with this Dr. Sheikh.  He is supposed to go over his MRIs with her.      In the meantime blood work ordered by me found that she was hyperthyroid.  She was unaware.  We are trying to get her in with an endocrinologist.  Also she was told by the  "[facility] nurse that her scans there saw a large thyroid gland.  She does validate having tachycardia and tremors    She also has a pain management appointment for next week in [place] with [facility].    She has a history of a pelvic mass that was supposed to be biopsied or removed 2 years ago but insurance did not cover it.  She continues to describe obstructive symptoms and amenorrhea which came on about 5 years ago.  She agrees to see gyn now to follow-up on this matter and for Pap and mammogram      Review of Systems   Constitutional:  Negative for activity change, appetite change and fever.   HENT:  Negative for sore throat.    Respiratory:  Negative for cough and shortness of breath.    Cardiovascular:  Positive for palpitations. Negative for chest pain.   Gastrointestinal:  Negative for abdominal pain, diarrhea and nausea.   Genitourinary:  Negative for difficulty urinating.   Musculoskeletal:  Positive for back pain and neck pain. Negative for arthralgias and myalgias.   Neurological:  Positive for tremors. Negative for dizziness and headaches.       Objective:      Vitals:    08/27/24 0956   BP: 122/62   Pulse: 99   Temp: 98.1 °F (36.7 °C)   TempSrc: Tympanic   SpO2: 99%   Weight: 64.4 kg (141 lb 15.6 oz)   Height: 5' 3" (1.6 m)   PainSc:   8   PainLoc: Back     Physical Exam  Vitals and nursing note reviewed.   Constitutional:       General: She is not in acute distress.     Appearance: She is well-developed. She is not diaphoretic.      Comments: Much less agitated.  Able to stay on topic better   HENT:      Head: Normocephalic.   Neck:      Thyroid: Thyromegaly present.   Cardiovascular:      Rate and Rhythm: Normal rate and regular rhythm.      Heart sounds: Normal heart sounds. No murmur heard.  Pulmonary:      Effort: Pulmonary effort is normal.      Breath sounds: Normal breath sounds. No wheezing or rales.   Abdominal:      General: There is no distension.      Palpations: Abdomen is soft. "   Musculoskeletal:      Cervical back: Neck supple.   Lymphadenopathy:      Cervical: No cervical adenopathy.   Skin:     General: Skin is warm and dry.   Neurological:      Mental Status: She is alert and oriented to person, place, and time.   Psychiatric:         Behavior: Behavior normal.         Thought Content: Thought content normal.         Judgment: Judgment normal.         Assessment:       1. Hyperthyroidism    2. Back pain, unspecified back location, unspecified back pain laterality, unspecified chronicity    3. Pelvic mass        Plan:   Hyperthyroidism  Comments:  Start beta-blocker.  Continue to work on getting access to endocrinology ASAP    Back pain, unspecified back location, unspecified back pain laterality, unspecified chronicity  Comments:  Follow-up with neuro surgery and pain management in the next one-week  Orders:  -     ketorolac injection 30 mg    Pelvic mass  Comments:  Unspecified.  Last addressed in 2022.  Gyn consult  Orders:  -     Ambulatory referral/consult to Gynecology; Future; Expected date: 09/03/2024    Other orders  -     atenoloL (TENORMIN) 25 MG tablet; Take 1 tablet (25 mg total) by mouth once daily. Take every day, For control of symptoms associated with overactive thyroid  Dispense: 30 tablet; Refill: 1

## 2024-08-28 ENCOUNTER — PATIENT MESSAGE (OUTPATIENT)
Dept: FAMILY MEDICINE | Facility: CLINIC | Age: 49
End: 2024-08-28
Payer: MEDICARE

## 2024-08-28 ENCOUNTER — TELEPHONE (OUTPATIENT)
Dept: FAMILY MEDICINE | Facility: CLINIC | Age: 49
End: 2024-08-28
Payer: MEDICARE

## 2024-08-28 DIAGNOSIS — M54.9 BACK PAIN, UNSPECIFIED BACK LOCATION, UNSPECIFIED BACK PAIN LATERALITY, UNSPECIFIED CHRONICITY: Primary | ICD-10-CM

## 2024-08-28 NOTE — TELEPHONE ENCOUNTER
----- Message from Katiana Johnson sent at 8/28/2024 11:54 AM CDT -----  Who Called: Pt    What is the request in detail: Requesting call back to discuss just left neurosurgery and it was not a good appt. Pt needs new neurosurgery appt. Please advise    Can the clinic reply by [facility]? No    Best Call Back Number: [phone]      Additional Information:

## 2024-08-29 ENCOUNTER — PATIENT MESSAGE (OUTPATIENT)
Dept: ADMINISTRATIVE | Facility: HOSPITAL | Age: 49
End: 2024-08-29
Payer: MEDICARE

## 2024-08-30 DIAGNOSIS — Z12.11 SCREENING FOR COLON CANCER: ICD-10-CM

## 2024-09-05 ENCOUNTER — PATIENT MESSAGE (OUTPATIENT)
Dept: FAMILY MEDICINE | Facility: CLINIC | Age: 49
End: 2024-09-05
Payer: MEDICARE

## 2024-09-05 ENCOUNTER — TELEPHONE (OUTPATIENT)
Dept: NEUROSURGERY | Facility: CLINIC | Age: 49
End: 2024-09-05
Payer: MEDICARE

## 2024-09-05 ENCOUNTER — OFFICE VISIT (OUTPATIENT)
Dept: PAIN MEDICINE | Facility: CLINIC | Age: 49
End: 2024-09-05
Payer: MEDICARE

## 2024-09-05 VITALS — WEIGHT: 142 LBS | HEIGHT: 63 IN | BODY MASS INDEX: 25.16 KG/M2

## 2024-09-05 DIAGNOSIS — M79.18 MYOFASCIAL PAIN SYNDROME: Primary | ICD-10-CM

## 2024-09-05 DIAGNOSIS — M41.9 SCOLIOSIS, UNSPECIFIED SCOLIOSIS TYPE, UNSPECIFIED SPINAL REGION: ICD-10-CM

## 2024-09-05 PROCEDURE — 99999 PR PBB SHADOW E&M-EST. PATIENT-LVL III: CPT | Mod: PBBFAC,,, | Performed by: STUDENT IN AN ORGANIZED HEALTH CARE EDUCATION/TRAINING PROGRAM

## 2024-09-05 NOTE — PROGRESS NOTES
"[facility] - Department    Eamon Thorne MD      First Office Visit: 9/5/2024   Today' Date: 9/5/2024  Last Office Visit: None    Chief complaint: generalized pain     HPI: Pt is a 49 y.o., who presents for evaluation. Referred by Dr. Thorne. Pt complains of generalized all-over body pain including neck, left ribs, lower back for years.  Endorses having pain that shifts around the body.  States neck pain starts in the neck and goes down to bilateral shoulders.  Endorses having numbness of both hands.  Endorses having headaches, balance issues, and problems dropping objects.  Back pain stays in the lower back and is constant.  Denies having sharp shooting pain going down the legs.  Endorses having left rib pain and states she can feel the spleen "catching." Of note, patient has been on chronic opiates for years.  States she has tried injections in the past in her lower back.  States she is scheduled to see a neurosurgeon next week.  No BB changes.  Has not tried PT.        Pain disability index score: 63  Pain score: 8    Relevant Imaging/ Testing:   MR C-spine - foraminal stenosis C5-6   XR C-spine 8/24  XR T/L-spine 8/24    Procedures: None    Date of board of pharmacy review:9/5/2024  Date of opioid risk screening/ pain psych: None  Date of opioid agreement and consent: None  Date of urine drug screen: None  Date of random pill count: None     was reviewed today: reviewed, norco use     Prescribed medications: None    See EHR for  PMH, PSH, FH, SH, Medications and Allergy    ROS:  Positive for pain  ROS     PE:  There were no vitals filed for this visit.  General: Pleasant, no distress  HEENT: NC/ AT. PERRLA  CV: Radial pulses intact  Pulm: No distress  Ext: No edema    Physical Exam     Neuromusculoskeletal:  Head: NC, AT. PERRLA. No occipital tenderness  Neck: limited range of motions d/t pain, pain w/ extension, flexion, rotation. Pos Facet loading. Neg Spurling.  Marked tenderness to light touch.  5/5 " "Strength, normal tone. Neg Avelar's  Shoulder:  Limited range of motion d/t pain. Min Bicep groove tenderness. 5/5 Strength  Lumbar: limited range of motion d/t pain. Bilat Facet loading.  Marked tenderness to light touch. Neg SL.  Pain with flexion and extension.   Hip: Intact range of motion  SI: Level, Min tenderness  Knee: Intact range of motion  Reflexes: normal Bicep. Knee  Strength: 5/5 globally   Sensory: Grossly intact   Skin: No bruising, erythema  Gait: Normal      Impression:  Generalized pain  Neck pain   L rib pain  Back pain   Relevant History  BMI 25.15      Assessment:  Myofascial pain syndrome  Cervical radiculopathy  Axial back pain     Plan:  Discussed options  Imaging/ relevant records viewed/ reviewed/ discussed  Imaging results viewed and reviewed (noted above)/ reviewed with patient   reviewed  PT trial - patient upset at recommendation to try PT. states she could hardly afford this clinic visit let alone PT sessions.  Patient states "how do you expect physical therapy to help with the pain." I explained to pt PT/HEP is the starting point of pain management. Pt walked out of clinic prior to discussion of additional workup.        Prescribed medications:  1. None    The impression and plan were discussed and explained in detail. All the questions were answered. Education was provided accordingly.         Follow-up:  As needed    Geri Becerril MD     "

## 2024-09-12 ENCOUNTER — TELEPHONE (OUTPATIENT)
Dept: NEUROSURGERY | Facility: CLINIC | Age: 49
End: 2024-09-12
Payer: MEDICARE
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
## 2024-09-17 ENCOUNTER — TELEPHONE (OUTPATIENT)
Dept: PAIN MEDICINE | Facility: CLINIC | Age: 49
End: 2024-09-17
Payer: MEDICARE

## 2024-09-23 ENCOUNTER — TELEPHONE (OUTPATIENT)
Dept: NEUROSURGERY | Facility: CLINIC | Age: 49
End: 2024-09-23
Payer: MEDICARE

## 2024-09-23 DIAGNOSIS — G89.29 OTHER CHRONIC PAIN: ICD-10-CM

## 2024-09-23 DIAGNOSIS — M41.9 SCOLIOSIS, UNSPECIFIED SCOLIOSIS TYPE, UNSPECIFIED SPINAL REGION: Primary | ICD-10-CM

## 2024-09-23 NOTE — TELEPHONE ENCOUNTER
Called patient to schedule an upcoming appointment with Beka Dwyer MD. No response, LVM and provided next appointment date and time.  Future Appointments   Date Time Provider Department Center   10/2/2024 10:15 AM [facility]    11/21/2024 11:30 AM [facility]   11/21/2024 11:45 AM [facility]   11/21/2024 12:00 PM [facility]   11/21/2024  1:00 PM Beka Dwyer MD [facility]        Will still need MRI before appointment will be finalized

## 2024-09-27 DIAGNOSIS — R56.9 SEIZURE: ICD-10-CM

## 2024-09-27 NOTE — TELEPHONE ENCOUNTER
Refill Routing Note   Medication(s) are not appropriate for processing by [facility] for the following reason(s):        Outside of protocol    [facility] action(s):  Route               Appointments  past 12m or future 3m with PCP    Date Provider   Last Visit   8/27/2024 Eamon Thorne MD   Next Visit   Visit date not found Eamon Thorne MD   ED visits in past 90 days: 0        Note composed:2:39 PM 09/27/2024

## 2024-10-02 RX ORDER — TOPIRAMATE 200 MG/1
TABLET ORAL
Qty: 90 TABLET | Refills: 1 | OUTPATIENT
Start: 2024-10-02

## 2024-10-09 DIAGNOSIS — R56.9 SEIZURE: ICD-10-CM

## 2024-10-10 RX ORDER — TOPIRAMATE 200 MG/1
TABLET ORAL
Qty: 90 TABLET | Refills: 1 | Status: SHIPPED | OUTPATIENT
Start: 2024-10-10

## 2024-10-16 ENCOUNTER — PATIENT MESSAGE (OUTPATIENT)
Dept: FAMILY MEDICINE | Facility: CLINIC | Age: 49
End: 2024-10-16
Payer: MEDICARE

## 2024-11-05 ENCOUNTER — TELEPHONE (OUTPATIENT)
Dept: NEUROSURGERY | Facility: CLINIC | Age: 49
End: 2024-11-05
Payer: MEDICARE

## 2024-11-05 RX ORDER — ATENOLOL 25 MG/1
25 TABLET ORAL DAILY
Qty: 90 TABLET | Refills: 3 | Status: SHIPPED | OUTPATIENT
Start: 2024-11-05

## 2024-11-05 NOTE — TELEPHONE ENCOUNTER
Refill Routing Note   Medication(s) are not appropriate for processing by [facility] for the following reason(s):        New or recently adjusted medication    [facility] action(s):  Defer               Appointments  past 12m or future 3m with PCP    Date Provider   Last Visit   8/27/2024 Eamon Thorne MD   Next Visit   Visit date not found Eamon Thorne MD   ED visits in past 90 days: 0        Note composed:10:11 AM 11/05/2024

## 2024-11-05 NOTE — TELEPHONE ENCOUNTER
L.m on pt. V/m that md is out on 11/21 and we need to r/s her appt.  Also, has pt. Done injections with pain mgmt or PT?  Per jovani william NP pt will also need MRI, CTL spine.  Asked pt. To ret. Our call to r/s

## 2024-11-05 NOTE — TELEPHONE ENCOUNTER
No care due was identified.  [facility] Embedded Care Due Messages. Reference number: [id number].   11/05/2024 5:38:13 AM CST

## 2024-11-19 ENCOUNTER — TELEPHONE (OUTPATIENT)
Dept: NEUROSURGERY | Facility: CLINIC | Age: 49
End: 2024-11-19
Payer: MEDICARE

## 2024-11-19 NOTE — TELEPHONE ENCOUNTER
L.m on pt. V/m to please send us the disc of the MRI so we can get uploaded and schedule pt. Appropriately.

## 2024-11-19 NOTE — TELEPHONE ENCOUNTER
P/c to pt. That we did in fact receive her disc. & once uploaded and reviewed we will contact her with an appt date and time.

## 2024-11-19 NOTE — TELEPHONE ENCOUNTER
----- Message from Adi sent at 11/19/2024 12:02 PM CST -----  Regarding: Callback  Contact: [phone]  Patient calling requesting a callback from nurse or provider in regards to her package for her disc of MRI's missing. She said the details said someone from the office picked it up on 11-09-24. Please call back as soon as possible.    She said the tracking number: [id number]

## 2024-11-26 ENCOUNTER — PATIENT MESSAGE (OUTPATIENT)
Dept: NEUROSURGERY | Facility: CLINIC | Age: 49
End: 2024-11-26
Payer: MEDICARE

## 2024-12-04 DIAGNOSIS — R56.9 SEIZURE: ICD-10-CM

## 2024-12-04 RX ORDER — OMEPRAZOLE 40 MG/1
40 CAPSULE, DELAYED RELEASE ORAL
Qty: 180 CAPSULE | Refills: 2 | Status: SHIPPED | OUTPATIENT
Start: 2024-12-04

## 2024-12-04 RX ORDER — TOPIRAMATE 200 MG/1
TABLET ORAL
Qty: 90 TABLET | Refills: 0 | Status: SHIPPED | OUTPATIENT
Start: 2024-12-04

## 2024-12-04 NOTE — TELEPHONE ENCOUNTER
Refill Routing Note   Medication(s) are not appropriate for processing by [facility] for the following reason(s):        Outside of protocol    [facility] action(s):  Route        Medication Therapy Plan: Total daily dose exceeds maintenance dosing for PPI      Appointments  past 12m or future 3m with PCP    Date Provider   Last Visit   8/27/2024 Eamon Thorne MD   Next Visit   Visit date not found Eamon Thorne MD   ED visits in past 90 days: 0        Note composed:4:49 PM 12/04/2024

## 2024-12-04 NOTE — TELEPHONE ENCOUNTER
No care due was identified.  [facility] Embedded Care Due Messages. Reference number: [id number].   12/04/2024 5:48:17 AM CST

## 2025-01-02 DIAGNOSIS — R56.9 SEIZURE: ICD-10-CM

## 2025-01-03 RX ORDER — TOPIRAMATE 200 MG/1
TABLET ORAL
Qty: 90 TABLET | Refills: 0 | Status: SHIPPED | OUTPATIENT
Start: 2025-01-03

## 2025-01-16 ENCOUNTER — HOSPITAL ENCOUNTER (OUTPATIENT)
Dept: RADIOLOGY | Facility: HOSPITAL | Age: 50
Discharge: HOME | End: 2025-01-16
Attending: NEUROLOGICAL SURGERY
Payer: MEDICARE

## 2025-01-16 ENCOUNTER — OFFICE VISIT (OUTPATIENT)
Dept: NEUROSURGERY | Facility: CLINIC | Age: 50
End: 2025-01-16
Payer: MEDICARE

## 2025-01-16 VITALS — SYSTOLIC BLOOD PRESSURE: 108 MMHG | DIASTOLIC BLOOD PRESSURE: 75 MMHG | HEART RATE: 109 BPM

## 2025-01-16 DIAGNOSIS — M41.9 SCOLIOSIS, UNSPECIFIED SCOLIOSIS TYPE, UNSPECIFIED SPINAL REGION: ICD-10-CM

## 2025-01-16 DIAGNOSIS — G89.29 OTHER CHRONIC PAIN: ICD-10-CM

## 2025-01-16 DIAGNOSIS — M41.9 SCOLIOSIS, UNSPECIFIED SCOLIOSIS TYPE, UNSPECIFIED SPINAL REGION: Primary | ICD-10-CM

## 2025-01-16 PROCEDURE — 99214 OFFICE O/P EST MOD 30 MIN: CPT | Mod: S$GLB,,, | Performed by: PHYSICIAN ASSISTANT

## 2025-01-16 PROCEDURE — 3074F SYST BP LT 130 MM HG: CPT | Mod: CPTII,S$GLB,, | Performed by: PHYSICIAN ASSISTANT

## 2025-01-16 PROCEDURE — 3078F DIAST BP <80 MM HG: CPT | Mod: CPTII,S$GLB,, | Performed by: PHYSICIAN ASSISTANT

## 2025-01-16 PROCEDURE — 72114 X-RAY EXAM L-S SPINE BENDING: CPT | Mod: 26,,, | Performed by: RADIOLOGY

## 2025-01-16 PROCEDURE — 72082 X-RAY EXAM ENTIRE SPI 2/3 VW: CPT | Mod: 26,,, | Performed by: RADIOLOGY

## 2025-01-16 PROCEDURE — 72082 X-RAY EXAM ENTIRE SPI 2/3 VW: CPT | Mod: TC

## 2025-01-16 PROCEDURE — 72114 X-RAY EXAM L-S SPINE BENDING: CPT | Mod: TC

## 2025-01-16 PROCEDURE — 99999 PR PBB SHADOW E&M-EST. PATIENT-LVL I: CPT | Mod: PBBFAC,,, | Performed by: PHYSICIAN ASSISTANT
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
## 2025-02-01 DIAGNOSIS — R56.9 SEIZURE: ICD-10-CM

## 2025-02-03 RX ORDER — TOPIRAMATE 200 MG/1
TABLET ORAL
Qty: 90 TABLET | Refills: 0 | Status: SHIPPED | OUTPATIENT
Start: 2025-02-03

## 2025-02-03 NOTE — TELEPHONE ENCOUNTER
Refill Routing Note   Medication(s) are not appropriate for processing by [facility] for the following reason(s):        Outside of protocol    [facility] action(s):  Route               Appointments  past 12m or future 3m with PCP    Date Provider   Last Visit   8/27/2024 Eamon Thorne MD   Next Visit   Visit date not found Eamon Thorne MD   ED visits in past 90 days: 0        Note composed:9:17 AM 02/03/2025

## 2025-02-12 DIAGNOSIS — Z12.31 OTHER SCREENING MAMMOGRAM: ICD-10-CM

## 2025-03-03 ENCOUNTER — TELEPHONE (OUTPATIENT)
Dept: FAMILY MEDICINE | Facility: CLINIC | Age: 50
End: 2025-03-03
Payer: MEDICARE

## 2025-03-03 NOTE — TELEPHONE ENCOUNTER
Called patient after speaking with Dr. Thorne that she needed to be seen in 24 hours. Scheduled patient with Dr. Ramirez at [facility] as no providers had any openings tomorrow in our clinic. Patient voiced understanding.    Sanjuanita Ta LPN    ----- Message from Eamon Thorne MD sent at 3/3/2025  3:36 PM CST -----  Needs to be seen next 24 hours  ----- Message -----  From: Sanjuanita Ta LPN  Sent: 3/3/2025   2:59 PM CST  To: Eamon Thorne MD    This patient called today and I couldn't forward the message to you for some reason:Patient returned call and after verifying name and date of birth. Patient states that she has been running fever for quite a while and it runs as high as 104 degrees. Patient states that she has Graves disease and when she gets sick, she really gets sick. Fever is mostly at night and has been doing it for a couple of weeks now. She would like an antibiotic if possible, states she's tired of being sick.She has a virtual for Wednesday if it's needed

## 2025-03-03 NOTE — TELEPHONE ENCOUNTER
Attempted to return patient's call. There was no answer and a voicemail was left for patient to please call back.    Sanjuanita Ta LPN    ----- Message from Osmany sent at 3/3/2025  1:30 PM CST -----  Contact: CORBY Tellez is requesting a call back in regards to getting running high fever and ear pain. PLEASE CALL HER [phone]

## 2025-03-03 NOTE — TELEPHONE ENCOUNTER
Patient returned call and after verifying name and date of birth. Patient states that she has been running fever for quite a while and it runs as high as 104 degrees. Patient states that she has Graves disease and when she gets sick, she really gets sick. Fever is mostly at night and has been doing it for a couple of weeks now.   She would like an antibiotic if possible, states she's tired of being sick. Informed patient that I will send Dr. Thorne the message. A virtual visit was scheduled for 03/05/2025 just in case it was needed.    Sanjuanita Ta LPN

## 2025-03-04 ENCOUNTER — OFFICE VISIT (OUTPATIENT)
Dept: FAMILY MEDICINE | Facility: CLINIC | Age: 50
End: 2025-03-04
Payer: MEDICARE

## 2025-03-04 ENCOUNTER — HOSPITAL ENCOUNTER (EMERGENCY)
Facility: HOSPITAL | Age: 50
Discharge: HOME | End: 2025-03-04
Attending: FAMILY MEDICINE
Payer: MEDICARE

## 2025-03-04 ENCOUNTER — TELEPHONE (OUTPATIENT)
Dept: FAMILY MEDICINE | Facility: CLINIC | Age: 50
End: 2025-03-04

## 2025-03-04 VITALS
HEART RATE: 128 BPM | DIASTOLIC BLOOD PRESSURE: 62 MMHG | HEIGHT: 63 IN | TEMPERATURE: 100 F | WEIGHT: 139.13 LBS | BODY MASS INDEX: 24.65 KG/M2 | OXYGEN SATURATION: 99 % | SYSTOLIC BLOOD PRESSURE: 100 MMHG

## 2025-03-04 VITALS
DIASTOLIC BLOOD PRESSURE: 61 MMHG | HEART RATE: 85 BPM | RESPIRATION RATE: 18 BRPM | TEMPERATURE: 98 F | OXYGEN SATURATION: 100 % | SYSTOLIC BLOOD PRESSURE: 98 MMHG

## 2025-03-04 DIAGNOSIS — F41.9 ANXIETY: ICD-10-CM

## 2025-03-04 DIAGNOSIS — R00.0 TACHYCARDIA: ICD-10-CM

## 2025-03-04 DIAGNOSIS — R50.9 FEVER, UNSPECIFIED FEVER CAUSE: ICD-10-CM

## 2025-03-04 DIAGNOSIS — E05.90 HYPERTHYROIDISM: ICD-10-CM

## 2025-03-04 DIAGNOSIS — J11.1 INFLUENZA: Primary | ICD-10-CM

## 2025-03-04 DIAGNOSIS — J10.1 INFLUENZA A: ICD-10-CM

## 2025-03-04 DIAGNOSIS — E05.00 GRAVES DISEASE: ICD-10-CM

## 2025-03-04 DIAGNOSIS — Z91.89 AT HIGH RISK FOR THYROID STORM: Primary | ICD-10-CM

## 2025-03-04 LAB
ALBUMIN SERPL BCP-MCNC: 3.7 G/DL (ref 3.5–5.2)
ALP SERPL-CCNC: 180 U/L (ref 40–150)
ALT SERPL W/O P-5'-P-CCNC: 16 U/L (ref 10–44)
ANION GAP SERPL CALC-SCNC: 8 MMOL/L (ref 8–16)
AST SERPL-CCNC: 18 U/L (ref 10–40)
BACTERIA #/AREA URNS HPF: NORMAL /HPF
BASOPHILS # BLD AUTO: 0.01 K/UL (ref 0–0.2)
BASOPHILS NFR BLD: 0.3 % (ref 0–1.9)
BILIRUB SERPL-MCNC: 0.1 MG/DL (ref 0.1–1)
BILIRUB UR QL STRIP: NEGATIVE
BUN SERPL-MCNC: 17 MG/DL (ref 6–20)
CALCIUM SERPL-MCNC: 8.2 MG/DL (ref 8.7–10.5)
CHLORIDE SERPL-SCNC: 111 MMOL/L (ref 95–110)
CLARITY UR: CLEAR
CO2 SERPL-SCNC: 17 MMOL/L (ref 23–29)
COLOR UR: YELLOW
CREAT SERPL-MCNC: 0.7 MG/DL (ref 0.5–1.4)
DIFFERENTIAL METHOD BLD: ABNORMAL
EOSINOPHIL # BLD AUTO: 0 K/UL (ref 0–0.5)
EOSINOPHIL NFR BLD: 0.5 % (ref 0–8)
ERYTHROCYTE [DISTWIDTH] IN BLOOD BY AUTOMATED COUNT: 14.2 % (ref 11.5–14.5)
EST. GFR  (NO RACE VARIABLE): >60 ML/MIN/1.73 M^2
GLUCOSE SERPL-MCNC: 120 MG/DL (ref 70–110)
GLUCOSE UR QL STRIP: NEGATIVE
GROUP A STREP, MOLECULAR: NEGATIVE
HCT VFR BLD AUTO: 32.5 % (ref 37–48.5)
HGB BLD-MCNC: 10.4 G/DL (ref 12–16)
HGB UR QL STRIP: NEGATIVE
HYALINE CASTS #/AREA URNS LPF: 0 /LPF
IMM GRANULOCYTES # BLD AUTO: 0.01 K/UL (ref 0–0.04)
IMM GRANULOCYTES NFR BLD AUTO: 0.3 % (ref 0–0.5)
KETONES UR QL STRIP: NEGATIVE
LEUKOCYTE ESTERASE UR QL STRIP: ABNORMAL
LYMPHOCYTES # BLD AUTO: 2.1 K/UL (ref 1–4.8)
LYMPHOCYTES NFR BLD: 55.4 % (ref 18–48)
MCH RBC QN AUTO: 25.8 PG (ref 27–31)
MCHC RBC AUTO-ENTMCNC: 32 G/DL (ref 32–36)
MCV RBC AUTO: 81 FL (ref 82–98)
MICROSCOPIC COMMENT: NORMAL
MONOCYTES # BLD AUTO: 0.3 K/UL (ref 0.3–1)
MONOCYTES NFR BLD: 7 % (ref 4–15)
NEUTROPHILS # BLD AUTO: 1.4 K/UL (ref 1.8–7.7)
NEUTROPHILS NFR BLD: 36.5 % (ref 38–73)
NITRITE UR QL STRIP: NEGATIVE
NRBC BLD-RTO: 0 /100 WBC
OHS QRS DURATION: 66 MS
OHS QRS DURATION: 66 MS
OHS QTC CALCULATION: 435 MS
OHS QTC CALCULATION: 439 MS
PH UR STRIP: 6 [PH] (ref 5–8)
PLATELET # BLD AUTO: 143 K/UL (ref 150–450)
PMV BLD AUTO: 11.8 FL (ref 9.2–12.9)
POTASSIUM SERPL-SCNC: 3.1 MMOL/L (ref 3.5–5.1)
PROT SERPL-MCNC: 7.4 G/DL (ref 6–8.4)
PROT UR QL STRIP: ABNORMAL
RBC # BLD AUTO: 4.03 M/UL (ref 4–5.4)
RBC #/AREA URNS HPF: 1 /HPF (ref 0–4)
SODIUM SERPL-SCNC: 136 MMOL/L (ref 136–145)
SP GR UR STRIP: 1.03 (ref 1–1.03)
SQUAMOUS #/AREA URNS HPF: 5 /HPF
T3FREE SERPL-MCNC: 6.9 PG/ML (ref 2.3–4.2)
T4 FREE SERPL-MCNC: 1.14 NG/DL (ref 0.71–1.51)
TSH SERPL DL<=0.005 MIU/L-ACNC: <0.01 UIU/ML (ref 0.4–4)
URN SPEC COLLECT METH UR: ABNORMAL
UROBILINOGEN UR STRIP-ACNC: NEGATIVE EU/DL
WBC # BLD AUTO: 3.72 K/UL (ref 3.9–12.7)
WBC #/AREA URNS HPF: 4 /HPF (ref 0–5)

## 2025-03-04 PROCEDURE — 84439 ASSAY OF FREE THYROXINE: CPT | Performed by: FAMILY MEDICINE

## 2025-03-04 PROCEDURE — 3078F DIAST BP <80 MM HG: CPT | Mod: CPTII,S$GLB,, | Performed by: STUDENT IN AN ORGANIZED HEALTH CARE EDUCATION/TRAINING PROGRAM

## 2025-03-04 PROCEDURE — 1159F MED LIST DOCD IN RCRD: CPT | Mod: CPTII,S$GLB,, | Performed by: STUDENT IN AN ORGANIZED HEALTH CARE EDUCATION/TRAINING PROGRAM

## 2025-03-04 PROCEDURE — 96360 HYDRATION IV INFUSION INIT: CPT

## 2025-03-04 PROCEDURE — 85025 COMPLETE CBC W/AUTO DIFF WBC: CPT | Performed by: FAMILY MEDICINE

## 2025-03-04 PROCEDURE — 84481 FREE ASSAY (FT-3): CPT | Performed by: FAMILY MEDICINE

## 2025-03-04 PROCEDURE — 99285 EMERGENCY DEPT VISIT HI MDM: CPT | Mod: 25

## 2025-03-04 PROCEDURE — 80053 COMPREHEN METABOLIC PANEL: CPT | Performed by: FAMILY MEDICINE

## 2025-03-04 PROCEDURE — 93010 ELECTROCARDIOGRAM REPORT: CPT | Mod: ,,, | Performed by: INTERNAL MEDICINE

## 2025-03-04 PROCEDURE — 99999 PR PBB SHADOW E&M-EST. PATIENT-LVL III: CPT | Mod: PBBFAC,,, | Performed by: STUDENT IN AN ORGANIZED HEALTH CARE EDUCATION/TRAINING PROGRAM

## 2025-03-04 PROCEDURE — 93005 ELECTROCARDIOGRAM TRACING: CPT

## 2025-03-04 PROCEDURE — 3008F BODY MASS INDEX DOCD: CPT | Mod: CPTII,S$GLB,, | Performed by: STUDENT IN AN ORGANIZED HEALTH CARE EDUCATION/TRAINING PROGRAM

## 2025-03-04 PROCEDURE — 99215 OFFICE O/P EST HI 40 MIN: CPT | Mod: S$GLB,,, | Performed by: STUDENT IN AN ORGANIZED HEALTH CARE EDUCATION/TRAINING PROGRAM

## 2025-03-04 PROCEDURE — 87651 STREP A DNA AMP PROBE: CPT | Performed by: FAMILY MEDICINE

## 2025-03-04 PROCEDURE — 25000003 PHARM REV CODE 250: Performed by: FAMILY MEDICINE

## 2025-03-04 PROCEDURE — 84443 ASSAY THYROID STIM HORMONE: CPT | Performed by: FAMILY MEDICINE

## 2025-03-04 PROCEDURE — 96361 HYDRATE IV INFUSION ADD-ON: CPT

## 2025-03-04 PROCEDURE — 81000 URINALYSIS NONAUTO W/SCOPE: CPT | Performed by: FAMILY MEDICINE

## 2025-03-04 PROCEDURE — 3074F SYST BP LT 130 MM HG: CPT | Mod: CPTII,S$GLB,, | Performed by: STUDENT IN AN ORGANIZED HEALTH CARE EDUCATION/TRAINING PROGRAM

## 2025-03-04 RX ORDER — ACETAMINOPHEN 500 MG
500 TABLET ORAL EVERY 6 HOURS PRN
COMMUNITY

## 2025-03-04 RX ORDER — ACETAMINOPHEN 500 MG
1 TABLET ORAL EVERY MORNING
COMMUNITY
Start: 2024-10-15

## 2025-03-04 RX ORDER — OSELTAMIVIR PHOSPHATE 75 MG/1
75 CAPSULE ORAL 2 TIMES DAILY
Qty: 10 CAPSULE | Refills: 0 | Status: SHIPPED | OUTPATIENT
Start: 2025-03-04 | End: 2025-03-09

## 2025-03-04 RX ORDER — METHIMAZOLE 10 MG/1
1 TABLET ORAL EVERY MORNING
COMMUNITY
Start: 2024-10-24 | End: 2025-10-24

## 2025-03-04 RX ORDER — PROPRANOLOL HYDROCHLORIDE 20 MG/1
20 TABLET ORAL 2 TIMES DAILY
COMMUNITY
Start: 2024-10-14

## 2025-03-04 RX ADMIN — SODIUM CHLORIDE 1000 ML: 9 INJECTION, SOLUTION INTRAVENOUS at 01:03

## 2025-03-04 NOTE — ED NOTES
Bed: 16  Expected date:   Expected time:   Means of arrival: Ambulance Service  Comments:  olayinka
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
## 2025-03-04 NOTE — TELEPHONE ENCOUNTER
Spoke to pt.  Pt scheduled for 1 wk f/u per provider request.  Pt verbalized understanding of time and date of appointment.

## 2025-03-04 NOTE — TELEPHONE ENCOUNTER
----- Message from Lynn sent at 3/4/2025  3:08 PM CST -----  Type:  Needs Medical AdviceWho Called: CORBY SWAIN [[id number]]Symptoms (please be specific):  How long has patient had these symptoms:  Pharmacy name and phone #:  Would the patient rather a call back or a response via [facility]? Best Call Back Number:  [phone] Information: Patient stated Er is not doing anything. Patient is being sent home with Yusef Flu Please call

## 2025-03-04 NOTE — ED PROVIDER NOTES
SCRIBE #1 NOTE: I, January Farfan, am scribing for, and in the presence of, Lilibeth Navarro MD. I have scribed the entire note.       History     Chief Complaint   Patient presents with    Influenza     [facility] reports that  sent the pt. Over to rule out a tyroid storm. Pt. Tested + for Flu A today she also has a hx. Of graves disease.      Review of patient's allergies indicates:   Allergen Reactions    Monosodium glutamate Hives, Nausea And Vomiting and Swelling    Penicillins Anaphylaxis and Hives    Sulfa (sulfonamide antibiotics) Hives and Itching    Gadoteridol Other (See Comments) and Rash     Pt had 12mls Prohance for MRi. She had one red spot on her RT wrist. No hive noted.   Benadryl 25mg SIVP given. Ordered per Dr. Denis.         History of Present Illness     HPI    3/4/2025, 12:44 PM  History obtained from the patient and medical records      History of Present Illness: Rosalinda Gomes is a 49 y.o. female patient with a PMHx of scoliosis, hyperthyroidism, Graves disease, anemia, depression, seizures, traumatic head injury, memory deficit, shoulder pain, and pedal edema who presents to the Emergency Department for evaluation of generalized illness which began over the past month. Pt visited her PCP earlier today, and she was diagnosed with the flu and then sent to the ED to rule out a possible thyroid storm. Symptoms are constant and moderate in severity. Pt states she has a weak immune system and she gets one sickness after another. No mitigating or exacerbating factors reported. Associated sxs include fever, cough, tremors, abdominal pain, bilateral ear pain, scalp rash, diarrhea, constipation, sore throat, fatigue, and nausea. Pt notes her scalp rash was itching, and she initially thought it was a side effect of her thyroid medicine. She received medication for her scalp rash and says they provided relief to the itching. She also feels like her thyroid gland is enlarging and thinks it is  causing her ear pain. Patient denies any vomiting. She also denies any known Hx of thyroid storms. Pt also states she has not been able to eat or drink properly due to feeling sick. Prior Tx includes taking Tylenol PTA with some relief to sxs.  Pt notes she takes seizure medications. She reports compliance with all her medications. Pt has not tried taking Tamiflu. No further complaints or concerns at this time.       Arrival mode: [facility]    PCP: Shannan Ramirez MD        Past Medical History:  Past Medical History:   Diagnosis Date    Anemia     Depression     Head injury due to trauma 2014    Memory deficit     Pedal edema     lower ext    Seizures     6 months     Shoulder pain        Past Surgical History:  Past Surgical History:   Procedure Laterality Date    BRAIN SURGERY      COSMETIC SURGERY      HAND SURGERY      HIP ARTHROSCOPY W/ LABRAL REPAIR N/A     SHOULDER ARTHROSCOPY           Family History:  No family history on file.    Social History:  Social History     Tobacco Use    Smoking status: Former     Passive exposure: Past    Smokeless tobacco: Never   Substance and Sexual Activity    Alcohol use: Never    Drug use: Not Currently    Sexual activity: Not Currently        Review of Systems     Review of Systems   Constitutional:  Positive for fatigue and fever.   HENT:  Positive for ear pain (bilateral) and sore throat.         (+) neck swelling/enlargement at thyroid region   Respiratory:  Positive for cough. Negative for shortness of breath.    Cardiovascular:  Negative for chest pain.   Gastrointestinal:  Positive for abdominal pain, constipation (alternates with diarrhea), diarrhea (alternates with constipation) and nausea. Negative for vomiting.   Genitourinary:  Negative for dysuria.   Musculoskeletal:  Negative for back pain.   Skin:  Positive for rash (on scalp; also itches).   Neurological:  Positive for tremors. Negative for weakness.   Hematological:  Does not bruise/bleed easily.   All other  systems reviewed and are negative.     Physical Exam     Initial Vitals [03/04/25 1202]   BP Pulse Resp Temp SpO2   105/70 109 20 99.1 °F (37.3 °C) 97 %      MAP       --          Physical Exam  Nursing Notes and Vital Signs Reviewed.  Constitutional: Patient is in no acute distress. Well-developed and well-nourished.  Head: Atraumatic. Normocephalic.  Eyes: Has exophthalmos. PERRL. EOM intact. Conjunctivae are not pale. No scleral icterus.  ENT: Mucous membranes are moist. Oropharynx is clear and symmetric.    Neck:  Thyroid enlargement with tenderness. Supple. Full ROM. No lymphadenopathy.  Cardiovascular: Tachycardic. Regular rhythm. No murmurs, rubs, or gallops. Distal pulses are 2+ and symmetric.  Pulmonary/Chest: No respiratory distress. Clear to auscultation bilaterally. No wheezing or rales.  Abdominal: Soft and non-distended.  There is no tenderness.  No rebound, guarding, or rigidity. Good bowel sounds.  Genitourinary: No CVA tenderness.  Musculoskeletal: Moves all extremities. No obvious deformities. No edema. No calf tenderness.  Skin: Warm and dry.  Neurological:  Alert, awake, and appropriate.  Normal speech.  No acute focal neurological deficits are appreciated.  Psychiatric: Anxious. Good eye contact. Appropriate in content.      ED Course   Procedures  ED Vital Signs:  Vitals:    03/04/25 1202 03/04/25 1444 03/04/25 1445   BP: 105/70 98/61 98/61   Pulse: 109 87 85   Resp: 20 16 18   Temp: 99.1 °F (37.3 °C) 98.4 °F (36.9 °C)    TempSrc: Oral Oral    SpO2: 97% 100% 100%       Abnormal Lab Results:  Labs Reviewed   CBC W/ AUTO DIFFERENTIAL - Abnormal       Result Value    WBC 3.72 (*)     RBC 4.03      Hemoglobin 10.4 (*)     Hematocrit 32.5 (*)     MCV 81 (*)     MCH 25.8 (*)     MCHC 32.0      RDW 14.2      Platelets 143 (*)     MPV 11.8      Immature Granulocytes 0.3      Gran # (ANC) 1.4 (*)     Immature Grans (Abs) 0.01      Lymph # 2.1      Mono # 0.3      Eos # 0.0      Baso # 0.01      nRBC 0       Gran % 36.5 (*)     Lymph % 55.4 (*)     Mono % 7.0      Eosinophil % 0.5      Basophil % 0.3      Differential Method Automated     COMPREHENSIVE METABOLIC PANEL - Abnormal    Sodium 136      Potassium 3.1 (*)     Chloride 111 (*)     CO2 17 (*)     Glucose 120 (*)     BUN 17      Creatinine 0.7      Calcium 8.2 (*)     Total Protein 7.4      Albumin 3.7      Total Bilirubin 0.1      Alkaline Phosphatase 180 (*)     AST 18      ALT 16      eGFR >60      Anion Gap 8     URINALYSIS - Abnormal    Specimen UA Urine, Clean Catch      Color, UA Yellow      Appearance, UA Clear      pH, UA 6.0      Specific Gravity, UA 1.030      Protein, UA 1+ (*)     Glucose, UA Negative      Ketones, UA Negative      Bilirubin (UA) Negative      Occult Blood UA Negative      Nitrite, UA Negative      Urobilinogen, UA Negative      Leukocytes, UA Trace (*)    TSH - Abnormal    TSH <0.010 (*)    T3, FREE - Abnormal    T3, Free 6.9 (*)    GROUP A STREP, MOLECULAR    Group A Strep, Molecular Negative     T4, FREE    Free T4 1.14     URINALYSIS MICROSCOPIC    RBC, UA 1      WBC, UA 4      Bacteria None      Squam Epithel, UA 5      Hyaline Casts, UA 0      Microscopic Comment SEE COMMENT          All Lab Results:  Results for orders placed or performed during the hospital encounter of 03/04/25   EKG 12-lead    Collection Time: 03/04/25 12:27 PM   Result Value Ref Range    QRS Duration 66 ms    [facility] QTC Calculation 435 ms   EKG 12-lead    Collection Time: 03/04/25 12:28 PM   Result Value Ref Range    QRS Duration 66 ms    [facility] QTC Calculation 439 ms   CBC auto differential    Collection Time: 03/04/25 12:48 PM   Result Value Ref Range    WBC 3.72 (L) 3.90 - 12.70 K/uL    RBC 4.03 4.00 - 5.40 M/uL    Hemoglobin 10.4 (L) 12.0 - 16.0 g/dL    Hematocrit 32.5 (L) 37.0 - 48.5 %    MCV 81 (L) 82 - 98 fL    MCH 25.8 (L) 27.0 - 31.0 pg    MCHC 32.0 32.0 - 36.0 g/dL    RDW 14.2 11.5 - 14.5 %    Platelets 143 (L) 150 - 450 K/uL    MPV 11.8 9.2 - 12.9  fL    Immature Granulocytes 0.3 0.0 - 0.5 %    Gran # (ANC) 1.4 (L) 1.8 - 7.7 K/uL    Immature Grans (Abs) 0.01 0.00 - 0.04 K/uL    Lymph # 2.1 1.0 - 4.8 K/uL    Mono # 0.3 0.3 - 1.0 K/uL    Eos # 0.0 0.0 - 0.5 K/uL    Baso # 0.01 0.00 - 0.20 K/uL    nRBC 0 0 /100 WBC    Gran % 36.5 (L) 38.0 - 73.0 %    Lymph % 55.4 (H) 18.0 - 48.0 %    Mono % 7.0 4.0 - 15.0 %    Eosinophil % 0.5 0.0 - 8.0 %    Basophil % 0.3 0.0 - 1.9 %    Differential Method Automated    Comprehensive metabolic panel    Collection Time: 03/04/25 12:48 PM   Result Value Ref Range    Sodium 136 136 - 145 mmol/L    Potassium 3.1 (L) 3.5 - 5.1 mmol/L    Chloride 111 (H) 95 - 110 mmol/L    CO2 17 (L) 23 - 29 mmol/L    Glucose 120 (H) 70 - 110 mg/dL    BUN 17 6 - 20 mg/dL    Creatinine 0.7 0.5 - 1.4 mg/dL    Calcium 8.2 (L) 8.7 - 10.5 mg/dL    Total Protein 7.4 6.0 - 8.4 g/dL    Albumin 3.7 3.5 - 5.2 g/dL    Total Bilirubin 0.1 0.1 - 1.0 mg/dL    Alkaline Phosphatase 180 (H) 40 - 150 U/L    AST 18 10 - 40 U/L    ALT 16 10 - 44 U/L    eGFR >60 >60 mL/min/1.73 m^2    Anion Gap 8 8 - 16 mmol/L   TSH    Collection Time: 03/04/25 12:48 PM   Result Value Ref Range    TSH <0.010 (L) 0.400 - 4.000 uIU/mL   T4, free    Collection Time: 03/04/25 12:48 PM   Result Value Ref Range    Free T4 1.14 0.71 - 1.51 ng/dL   T3, free    Collection Time: 03/04/25 12:48 PM   Result Value Ref Range    T3, Free 6.9 (H) 2.3 - 4.2 pg/mL   Group A Strep, Molecular    Collection Time: 03/04/25 12:56 PM    Specimen: Throat   Result Value Ref Range    Group A Strep, Molecular Negative Negative   Urinalysis - Clean Catch    Collection Time: 03/04/25  1:36 PM   Result Value Ref Range    Specimen UA Urine, Clean Catch     Color, UA Yellow Yellow, Straw, Dipika    Appearance, UA Clear Clear    pH, UA 6.0 5.0 - 8.0    Specific Gravity, UA 1.030 1.005 - 1.030    Protein, UA 1+ (A) Negative    Glucose, UA Negative Negative    Ketones, UA Negative Negative    Bilirubin (UA) Negative Negative     Occult Blood UA Negative Negative    Nitrite, UA Negative Negative    Urobilinogen, UA Negative <2.0 EU/dL    Leukocytes, UA Trace (A) Negative   Urinalysis Microscopic    Collection Time: 03/04/25  1:36 PM   Result Value Ref Range    RBC, UA 1 0 - 4 /hpf    WBC, UA 4 0 - 5 /hpf    Bacteria None None-Occ /hpf    Squam Epithel, UA 5 /hpf    Hyaline Casts, UA 0 0-1/lpf /lpf    Microscopic Comment SEE COMMENT        Imaging Results:  Imaging Results              X-Ray Chest 1 View (Final result)  Result time 03/04/25 12:44:34      Final result by Ricco Pugh MD (03/04/25 12:44:34)                   Impression:      No acute process seen.      Electronically signed by: Ricco Pugh MD  Date:    03/04/2025  Time:    12:44               Narrative:    EXAMINATION:  XR CHEST 1 VIEW    CLINICAL HISTORY:  Influenza due to unidentified influenza virus with other respiratory manifestations    FINDINGS:  Single view of the chest.  No comparison    Cardiac silhouette is normal.  The lungs demonstrate no evidence of active disease.  No evidence of pleural effusion or pneumothorax.  Bones appear intact.  Moderate degenerative changes and moderate atherosclerotic disease.  Scoliosis.                                       The EKG was ordered, reviewed, and independently interpreted by the ED provider.  Interpretation time: 12:27  Rate: 107 BPM  Rhythm: sinus tachycardia  Interpretation: Possible Left atrial enlargement. Nonspecific T wave abnormality. No STEMI.    Interpretation: 12:28  Rate: 108 BPM  Rhythm: sinus tachycardia  Interpretation: Nonspecific T wave abnormality. No STEMI.  When compared with EKG performed on 04-Mar-2025 at 12:27, no significant change was found.           The Emergency Provider reviewed the vital signs and test results, which are outlined above.     ED Discussion     2:39 PM: Discussed pt's case with Jena Judge NP and Dr. Rik Alfaro MD ([facility]) regarding vitals and lab  results and evaluation for thyroid storm. Jena Judge NP advises updating the pt's vitals and asks Dr. Alfaro to review. After review, Dr. Alfaro states the main concern would be heart failure and associated sxs of CHF; if the pt clinically appears stable and is A&O x3, then it is unlikely that she has a thyroid storm.     2:50 PM: Reassessed pt at this time. Discussed with patient all pertinent ED information and results. Discussed pt dx and plan of tx. Gave the patient all f/u and return to the ED instructions. All questions and concerns were addressed at this time. Patient expresses understanding of information and instructions, and is comfortable with plan to discharge. Pt is stable for discharge.     I discussed with patient that evaluation in the ED does not suggest any emergent or life threatening medical conditions requiring immediate intervention beyond what was provided in the ED, and I believe patient is safe for discharge. Regardless, an unremarkable evaluation in the ED does not preclude the development or presence of a serious of life threatening condition. As such, I instructed that the patient is to return immediately for any worsening or change in current symptoms.         Medical Decision Making  50 yo female with h/o Graves disease reports fever for past month.  She went to PCP today and was sent to ED to rule out thyroid storm.  Patient has multiple c/o fever, cough, tremors, abdominal pain, back pain, dysphagia and bilateral ear pain, scalp rash.  She feels like her goiter is enlarging and more tender.  She has diarrhea alternating with constipation.  She says her immune system is weak and every time she gets sick it becomes serious.  She was diagnosed with flu at PCP office today.  She says she has been compliant with home meds.  She was tachycardic , temp 99.1 (took tylenol this am before appointment).  She has exophthalmia and goiter is symmetric and tender to palpation.  TSH 0.01, T4  1.14.  Consult with  felt like patients labs and vitals were stable for d/c and outpatient f/u Graves disease.      Amount and/or Complexity of Data Reviewed  Independent Historian: EMS     Details: [facility] reports the pt was sent to the ED to rule out a possible thyroid storm.  External Data Reviewed: labs, radiology, ECG and notes.  Labs: ordered. Decision-making details documented in ED Course.  Radiology: ordered and independent interpretation performed. Decision-making details documented in ED Course.  ECG/medicine tests: ordered and independent interpretation performed. Decision-making details documented in ED Course.    Risk  OTC drugs.  Prescription drug management.  Decision regarding hospitalization.    Critical Care  Total time providing critical care: 45 minutes                ED Medication(s):  Medications   sodium chloride 0.9% bolus 1,000 mL 1,000 mL (0 mLs Intravenous Stopped 3/4/25 1447)       Discharge Medication List as of 3/4/2025  2:42 PM        START taking these medications    Details   oseltamivir (TAMIFLU) 75 MG capsule Take 1 capsule (75 mg total) by mouth 2 (two) times daily. for 5 days, Starting Tue 3/4/2025, Until Sun 3/9/2025, Print              Follow-up Information       Eamon Thorne MD. Schedule an appointment as soon as possible for a visit in 1 week.    Specialty: Family Medicine  Contact information:  [address] [place] [address] [phone]                                 Scribe Attestation:   Scribe #1: I performed the above scribed service and the documentation accurately describes the services I performed. I attest to the accuracy of the note.     Attending:   Physician Attestation Statement for Scribe #1: I, Lilibeth Navarro MD, personally performed the services described in this documentation, as scribed by January Farfan, in my presence, and it is both accurate and complete.           Clinical Impression       ICD-10-CM ICD-9-CM   1. Influenza  J11.1  487.1   2. Anxiety  F41.9 300.00   3. Tachycardia  R00.0 785.0   4. Graves disease  E05.00 242.00   5. Hyperthyroidism  E05.90 242.90       Disposition:   Disposition: Discharged  Condition: Stable       Lilibeth Navarro MD  03/05/25 1027

## 2025-03-05 DIAGNOSIS — R56.9 SEIZURE: ICD-10-CM

## 2025-03-05 NOTE — TELEPHONE ENCOUNTER
"Called and spoke to pt.   Pt wanted to speak to nurse avendaño who is Dr. Gamez nurse, who was pulled here 3/4/25 to help with Dr. Ramirez. Pt believes that nurse kateryna is here today and would like to speak to nurse kateryna and when I tried to explain she wasn't here I would direct the message with her she stated "You are not listening to me bitch I'm going to hang up" Then hung up.   "

## 2025-03-05 NOTE — TELEPHONE ENCOUNTER
Refill Routing Note   Medication(s) are not appropriate for processing by [facility] for the following reason(s):        Outside of protocol    [facility] action(s):  Route               Appointments  past 12m or future 3m with PCP    Date Provider   Last Visit   8/27/2024 Eamon Thorne MD   Next Visit   Visit date not found Eamon Thorne MD   ED visits in past 90 days: 1        Note composed:3:58 PM 03/05/2025

## 2025-03-05 NOTE — TELEPHONE ENCOUNTER
----- Message from Yaya sent at 3/5/2025  4:10 PM CST -----  Contact: self  Type:  Needs Medical AdviceWho Called: Rosalinda Gordon (please be specific): pain in lower back, throwing up, fever, headaches, no strength How long has patient had these symptoms:  Pharmacy name and phone #: Triage #[phone] - [place], [place] - [address] [address] [address] [id number] [address] [facility] [id number]: [phone] Fax: [phone] the patient rather a call back or a response via [facility]? Call [facility] Call Back Number: [phone] Information: the oseltamivir (TAMIFLU) 75 MG capsule is making her sick, the patient wants to know if that's normal. She wants to know if you can see her EKG and labs. To see what's going on. the patient wants.

## 2025-03-07 RX ORDER — TOPIRAMATE 200 MG/1
TABLET ORAL
Qty: 90 TABLET | Refills: 0 | Status: SHIPPED | OUTPATIENT
Start: 2025-03-07

## 2025-04-09 DIAGNOSIS — R56.9 SEIZURE: ICD-10-CM

## 2025-04-09 NOTE — TELEPHONE ENCOUNTER
Refill Routing Note   Medication(s) are not appropriate for processing by [facility] for the following reason(s):        Outside of protocol    [facility] action(s):  Route               Appointments  past 12m or future 3m with PCP    Date Provider   Last Visit   8/27/2024 Eamon Thorne MD   Next Visit   Visit date not found Eamon Thorne MD   ED visits in past 90 days: 1        Note composed:10:55 AM 04/09/2025

## 2025-04-14 ENCOUNTER — TELEPHONE (OUTPATIENT)
Dept: FAMILY MEDICINE | Facility: CLINIC | Age: 50
End: 2025-04-14
Payer: MEDICARE

## 2025-04-14 DIAGNOSIS — R56.9 SEIZURE: ICD-10-CM

## 2025-04-14 RX ORDER — TOPIRAMATE 200 MG/1
200 TABLET, FILM COATED ORAL 3 TIMES DAILY
Qty: 90 TABLET | Refills: 0 | Status: CANCELLED | OUTPATIENT
Start: 2025-04-14

## 2025-04-14 NOTE — TELEPHONE ENCOUNTER
----- Message from Charo sent at 4/14/2025 12:54 PM CDT -----  Contact: Rosalinda  .Type:  RX Refill RequestWho Called: Jorge Aefill or New Rx:refillRX Name and Strength:topiramate (TOPAMAX) 200 MG TabHow is the patient currently taking it? (ex. 1XDay):TAKE 1 TABLET(200 MG) BY MOUTH THREE TIMES DAILYIs this a 30 day or 90 day RX:90Preferred Pharmacy with phone number:.[id number] - [place], [place] - [address] AT [address] OF [facility] [address] [id number] [facility] [address]Flqzw: [phone] Fax: [phone] or Mail Order:localOrdering Provider:ELAN MONTEJO STAFFWould the patient rather a call back or a response via [facility]? Call [facility] Call Back Number:.[phone] Additional Information: pt is Jasmyn

## 2025-04-16 ENCOUNTER — TELEPHONE (OUTPATIENT)
Dept: FAMILY MEDICINE | Facility: CLINIC | Age: 50
End: 2025-04-16
Payer: MEDICARE

## 2025-04-16 DIAGNOSIS — R56.9 SEIZURE: ICD-10-CM

## 2025-04-16 RX ORDER — TOPIRAMATE 200 MG/1
200 TABLET, FILM COATED ORAL 3 TIMES DAILY
Qty: 90 TABLET | Refills: 3 | Status: CANCELLED | OUTPATIENT
Start: 2025-04-16

## 2025-04-16 RX ORDER — TOPIRAMATE 200 MG/1
TABLET, FILM COATED ORAL
Qty: 90 TABLET | Refills: 0 | Status: SHIPPED | OUTPATIENT
Start: 2025-04-16

## 2025-04-16 NOTE — TELEPHONE ENCOUNTER
Spoke with patient in regards to her message she states she is out of medication TOPAMAX  and needs a refill. I did send a message to provider to get this resolved. I informed patient she verbalized understanding.

## 2025-05-19 DIAGNOSIS — R56.9 SEIZURE: ICD-10-CM

## 2025-05-20 NOTE — TELEPHONE ENCOUNTER
Refill Routing Note   Medication(s) are not appropriate for processing by [facility] for the following reason(s):        Outside of protocol: Topiramate    [facility] action(s):  Route             Appointments  past 12m or future 3m with PCP    Date Provider   Last Visit   4/12/2025 Eamon Thorne MD   Next Visit   Visit date not found Eamon Thorne MD   ED visits in past 90 days: 1        Note composed:11:47 PM 05/19/2025

## 2025-05-21 DIAGNOSIS — R56.9 SEIZURE: ICD-10-CM

## 2025-05-22 RX ORDER — TOPIRAMATE 200 MG/1
TABLET, FILM COATED ORAL
Qty: 90 TABLET | Refills: 0 | Status: SHIPPED | OUTPATIENT
Start: 2025-05-22

## 2025-05-23 RX ORDER — TOPIRAMATE 200 MG/1
200 TABLET, FILM COATED ORAL 3 TIMES DAILY
Qty: 90 TABLET | Refills: 0 | OUTPATIENT
Start: 2025-05-23

## 2025-08-04 DIAGNOSIS — R56.9 SEIZURE: ICD-10-CM

## 2025-08-05 NOTE — TELEPHONE ENCOUNTER
Refill Routing Note   Medication(s) are not appropriate for processing by [facility] for the following reason(s):        Outside of protocol    [facility] action(s):  Route             Appointments  past 12m or future 3m with PCP    Date Provider   Last Visit   4/12/2025 Eamon Thorne MD   Next Visit   Visit date not found Eamon Thorne MD   ED visits in past 90 days: 0        Note composed:8:11 AM 08/05/2025

## 2025-08-06 RX ORDER — TOPIRAMATE 200 MG/1
TABLET, FILM COATED ORAL
Qty: 90 TABLET | Refills: 0 | Status: SHIPPED | OUTPATIENT
Start: 2025-08-06

## 2025-08-21 DIAGNOSIS — R56.9 SEIZURE: ICD-10-CM

## 2025-08-22 DIAGNOSIS — R56.9 SEIZURE: ICD-10-CM

## 2025-08-22 RX ORDER — TOPIRAMATE 200 MG/1
200 TABLET, FILM COATED ORAL
Qty: 90 TABLET | Refills: 0 | OUTPATIENT
Start: 2025-08-22

## 2025-08-26 RX ORDER — TOPIRAMATE 200 MG/1
200 TABLET, FILM COATED ORAL
Qty: 90 TABLET | Refills: 0 | OUTPATIENT
Start: 2025-08-26

## 2025-08-27 ENCOUNTER — PATIENT OUTREACH (OUTPATIENT)
Dept: ADMINISTRATIVE | Facility: HOSPITAL | Age: 50
End: 2025-08-27
Payer: MEDICARE

## 2025-08-28 ENCOUNTER — OFFICE VISIT (OUTPATIENT)
Dept: FAMILY MEDICINE | Facility: CLINIC | Age: 50
End: 2025-08-28
Payer: MEDICARE

## 2025-08-28 DIAGNOSIS — H10.33 ACUTE BACTERIAL CONJUNCTIVITIS OF BOTH EYES: Primary | ICD-10-CM

## 2025-08-28 DIAGNOSIS — M54.9 BACK PAIN, UNSPECIFIED BACK LOCATION, UNSPECIFIED BACK PAIN LATERALITY, UNSPECIFIED CHRONICITY: ICD-10-CM

## 2025-08-28 DIAGNOSIS — E05.00 GRAVES DISEASE: ICD-10-CM

## 2025-08-28 PROCEDURE — 98005 SYNCH AUDIO-VIDEO EST LOW 20: CPT | Mod: 95,,, | Performed by: FAMILY MEDICINE

## 2025-08-28 RX ORDER — TOBRAMYCIN 3 MG/ML
1 SOLUTION/ DROPS OPHTHALMIC EVERY 4 HOURS
Qty: 5 ML | Refills: 0 | Status: SHIPPED | OUTPATIENT
Start: 2025-08-28

## (undated) DEVICE — BANDAID STRIP PLASTIC 3/4X3